# Patient Record
Sex: FEMALE | Race: WHITE | NOT HISPANIC OR LATINO | Employment: FULL TIME | URBAN - METROPOLITAN AREA
[De-identification: names, ages, dates, MRNs, and addresses within clinical notes are randomized per-mention and may not be internally consistent; named-entity substitution may affect disease eponyms.]

---

## 2021-06-04 ENCOUNTER — TRANSCRIBE ORDERS (OUTPATIENT)
Dept: ADMINISTRATIVE | Facility: HOSPITAL | Age: 46
End: 2021-06-04

## 2021-06-04 DIAGNOSIS — G62.9 PERIPHERAL NERVE DISORDER: Primary | ICD-10-CM

## 2021-09-19 ENCOUNTER — APPOINTMENT (EMERGENCY)
Dept: RADIOLOGY | Facility: HOSPITAL | Age: 46
End: 2021-09-19
Payer: COMMERCIAL

## 2021-09-19 ENCOUNTER — HOSPITAL ENCOUNTER (EMERGENCY)
Facility: HOSPITAL | Age: 46
Discharge: HOME | End: 2021-09-19
Attending: EMERGENCY MEDICINE | Admitting: EMERGENCY MEDICINE
Payer: COMMERCIAL

## 2021-09-19 VITALS
SYSTOLIC BLOOD PRESSURE: 180 MMHG | DIASTOLIC BLOOD PRESSURE: 88 MMHG | HEART RATE: 78 BPM | RESPIRATION RATE: 16 BRPM | OXYGEN SATURATION: 98 % | TEMPERATURE: 98 F | WEIGHT: 280 LBS

## 2021-09-19 DIAGNOSIS — M25.562 ACUTE PAIN OF LEFT KNEE: ICD-10-CM

## 2021-09-19 DIAGNOSIS — S83.412A SPRAIN OF MEDIAL COLLATERAL LIGAMENT OF LEFT KNEE, INITIAL ENCOUNTER: Primary | ICD-10-CM

## 2021-09-19 PROCEDURE — 99283 EMERGENCY DEPT VISIT LOW MDM: CPT

## 2021-09-19 PROCEDURE — 99284 EMERGENCY DEPT VISIT MOD MDM: CPT | Performed by: EMERGENCY MEDICINE

## 2021-09-19 PROCEDURE — 73564 X-RAY EXAM KNEE 4 OR MORE: CPT

## 2021-09-19 RX ORDER — ACETAMINOPHEN 325 MG/1
975 TABLET ORAL ONCE
Status: COMPLETED | OUTPATIENT
Start: 2021-09-19 | End: 2021-09-19

## 2021-09-19 RX ADMIN — ACETAMINOPHEN 975 MG: 325 TABLET, FILM COATED ORAL at 19:06

## 2021-09-19 NOTE — DISCHARGE INSTRUCTIONS
Knee Sprain   WHAT YOU NEED TO KNOW:   A knee sprain is a stretched or torn ligament in your knee  Ligaments support the knee and keep the joint and bones in the correct position  A knee sprain may involve one or more ligaments  DISCHARGE INSTRUCTIONS:   Return to the emergency department if:   · Any part of your leg feels cold, numb, or looks pale  Call your doctor if:   · You have new or increased swelling, bruising, or pain in your knee  · Your symptoms do not improve within 6 weeks, even with treatment  · You have questions or concerns about your condition or care  Medicines:   · NSAIDs , such as ibuprofen, help decrease swelling, pain, and fever  This medicine is available with or without a doctor's order  NSAIDs can cause stomach bleeding or kidney problems in certain people  If you take blood thinner medicine, always ask your healthcare provider if NSAIDs are safe for you  Always read the medicine label and follow directions  · Acetaminophen  decreases pain and fever  It is available without a doctor's order  Ask how much to take and how often to take it  Follow directions  Read the labels of all other medicines you are using to see if they also contain acetaminophen, or ask your doctor or pharmacist  Acetaminophen can cause liver damage if not taken correctly  Do not use more than 4 grams (4,000 milligrams) total of acetaminophen in one day  · Prescription pain medicine  may be given  Ask your healthcare provider how to take this medicine safely  Some prescription pain medicines contain acetaminophen  Do not take other medicines that contain acetaminophen without talking to your healthcare provider  Too much acetaminophen may cause liver damage  Prescription pain medicine may cause constipation  Ask your healthcare provider how to prevent or treat constipation  · Take your medicine as directed    Contact your healthcare provider if you think your medicine is not helping or if you have side effects  Tell him or her if you are allergic to any medicine  Keep a list of the medicines, vitamins, and herbs you take  Include the amounts, and when and why you take them  Bring the list or the pill bottles to follow-up visits  Carry your medicine list with you in case of an emergency  A support device  such as a splint or brace may be needed  These devices limit movement and protect the joint while it heals  You may be given crutches to use until you can stand on your injured leg without pain  Physical therapy  may be needed  A physical therapist teaches you exercises to help improve movement and strength, and to decrease pain  Manage a knee sprain:   · Rest  your knee and do not exercise  Do not walk on your injured leg if you are told to keep weight off your knee  Rest helps decrease swelling and allows the injury to heal  You can do gentle range of motion exercises as directed to prevent stiffness  · Apply ice  on your knee for 15 to 20 minutes every hour or as directed  Use an ice pack, or put crushed ice in a plastic bag  Cover the bag with a towel before you apply it  Ice helps prevent tissue damage and decreases swelling and pain  · Apply compression  to your knee as directed  You may need to wear an elastic bandage  This helps keep your injured knee from moving too much while it heals  It should be tight enough to give support but so tight that it causes your toes to feel numb or tingly  Take the bandage off and rewrap it at least 1 time each day  · Elevate your knee  above the level of your heart as often as you can  This will help decrease swelling and pain  Prop your leg on pillows or blankets to keep it elevated comfortably  Do not put pillows directly behind your knee  Prevent another knee sprain:  Exercise your legs to keep your muscles strong  Strong leg muscles help protect your knee and prevent strain   The following may also prevent a knee sprain:  · Slowly start your exercise or training program   Slowly increase the time, distance, and intensity of your exercise  Sudden increases in training may cause another knee sprain  · Wear protective braces and equipment as directed  Braces may prevent your knee from moving the wrong way and causing another sprain  Protective equipment may support your bones and ligaments to prevent injury  · Warm up and stretch before exercise  Warm up by walking or using an exercise bike before starting your regular exercise  Do gentle stretches after warming up  This helps to loosen your muscles and decrease stress on your knee  Cool down and stretch after you exercise  · Wear shoes that fit correctly and support your feet  Replace your running or exercise shoes before the padding or shock absorption is worn out  Ask your healthcare provider which exercise shoes are best for you  Ask if you should wear shoe inserts  Shoe inserts can help support your heels and arches or keep your foot lined up correctly in your shoes  Exercise on flat surfaces  Follow up with your doctor as directed:  Write down your questions so you remember to ask them during your visits  © Copyright [employer] 2021 Information is for End User's use only and may not be sold, redistributed or otherwise used for commercial purposes  All illustrations and images included in CareNotes® are the copyrighted property of [employer]  or [facility] Jonathan Chao   The above information is an  only  It is not intended as medical advice for individual conditions or treatments  Talk to your doctor, nurse or pharmacist before following any medical regimen to see if it is safe and effective for you

## 2021-09-19 NOTE — ED PROVIDER NOTES
History  Chief Complaint   Patient presents with    Knee Pain     c/o left knee pain, unknown cause     40year old female presents for evaluation of left knee pain which has been worsening since feeling a popping sensation while going up steps at [employer] this morning around 9:30 am  Patient reports pain of the medial knee as well as the popliteal fossa  Pain shoots into the left calf with pressure with a constant stiff sensation of the joint  Pain improves with the knee held in extension  No history of prior injury to the left lower extremity  Patient took aleve without significant improvement in pain  History provided by:  Patient  Knee Pain  Location:  Knee  Time since incident:  9 hours  Pain details:     Quality:  Shooting    Severity:  Severe    Onset quality:  Sudden    Duration:  9 hours    Timing:  Constant    Progression:  Waxing and waning  Chronicity:  New  Prior injury to area:  No  Relieved by: extension/rest   Worsened by:  Flexion and bearing weight  Associated symptoms: no fever    Risk factors: obesity    Risk factors: no recent illness        None       History reviewed  No pertinent past medical history  History reviewed  No pertinent surgical history  History reviewed  No pertinent family history  I have reviewed and agree with the history as documented  E-Cigarette/Vaping    E-Cigarette Use Never User      E-Cigarette/Vaping Substances    Nicotine No     THC No     CBD No     Flavoring No     Other No     Unknown No      Social History     Tobacco Use    Smoking status: Never Smoker   Vaping Use    Vaping Use: Never used   Substance Use Topics    Alcohol use: Not on file    Drug use: Not on file       Review of Systems   Constitutional: Negative for appetite change, chills and fever  HENT: Negative for congestion and sore throat  Respiratory: Negative for cough, chest tightness and shortness of breath  Cardiovascular: Negative for chest pain and leg swelling  Gastrointestinal: Negative for abdominal pain, constipation, diarrhea, nausea and vomiting  Musculoskeletal: Positive for arthralgias  Negative for myalgias  Skin: Negative for rash and wound  Neurological: Negative for dizziness, syncope and headaches  All other systems reviewed and are negative  Physical Exam  Physical Exam  Vitals and nursing note reviewed  Constitutional:       General: She is not in acute distress  Appearance: She is well-developed  She is not toxic-appearing or diaphoretic  HENT:      Head: Normocephalic and atraumatic  Right Ear: External ear normal       Left Ear: External ear normal       Nose: Nose normal    Eyes:      General: No scleral icterus  Pulmonary:      Effort: Pulmonary effort is normal  No respiratory distress  Abdominal:      General: There is no distension  Musculoskeletal:         General: No deformity  Normal range of motion  Comments: Tenderness medial left knee and popliteal fossa  Full ROM  Negative Will  No laxity on anterior/posterior drawer testing  Skin:     General: Skin is warm and dry  Findings: No rash  Neurological:      General: No focal deficit present  Mental Status: She is alert and oriented to person, place, and time     Psychiatric:         Mood and Affect: Mood normal          Vital Signs  ED Triage Vitals   Temperature Pulse Respirations Blood Pressure SpO2   09/19/21 1853 09/19/21 1853 09/19/21 1853 09/19/21 1853 09/19/21 1853   98 °F (36 7 °C) 101 18 (!) 184/91 94 %      Temp Source Heart Rate Source Patient Position - Orthostatic VS BP Location FiO2 (%)   09/19/21 1853 09/19/21 1853 09/19/21 1853 09/19/21 1853 --   Temporal Monitor Lying Right arm       Pain Score       09/19/21 1900       6           Vitals:    09/19/21 1853 09/19/21 1900   BP: (!) 184/91 (!) 184/91   Pulse: 101 88   Patient Position - Orthostatic VS: Lying Lying         Visual Acuity      ED Medications  Medications acetaminophen (TYLENOL) tablet 975 mg (975 mg Oral Given 9/19/21 1906)       Diagnostic Studies  Results Reviewed     None                 XR knee 4+ views left injury   ED Interpretation by Rishabh Davila MD (09/19 1920)   No acute fractures or dislocations                 Procedures  Procedures         ED Course  ED Course as of Sep 19 2011   Sun Sep 19, [id number] Systolic blood pressure left lower extremity 176 mmHg  Systolic blood pressure right upper extremity 184 mmHg  KAMILLA 0 956                                SBIRT 22yo+      Most Recent Value   SBIRT (23 yo +)   In order to provide better care to our patients, we are screening all of our patients for alcohol and drug use  Would it be okay to ask you these screening questions? No Filed at: 09/19/2021 1901                    MDM  Number of Diagnoses or Management Options  Acute pain of left knee: new and requires workup  Sprain of medial collateral ligament of left knee, initial encounter: new and requires workup  Diagnosis management comments: 40year old female presents for evaluation of left knee pain  Medial knee and popliteal fossa tenderness on exam  KAMILLA WNL  Xray unreamarkable on my interpretation  Possible MCL sprain  Ace wrap applied with normal distal sensation and perfusion prior to and post placement  RICE therapy  Ortho follow up  Return precautions provided         Amount and/or Complexity of Data Reviewed  Tests in the radiology section of CPT®: ordered  Independent visualization of images, tracings, or specimens: yes    Patient Progress  Patient progress: stable      Disposition  Final diagnoses:   Acute pain of left knee   Sprain of medial collateral ligament of left knee, initial encounter     Time reflects when diagnosis was documented in both MDM as applicable and the Disposition within this note     Time User Action Codes Description Comment    9/19/2021  7:21 PM Antolin Monson Add [M25 562] Left knee pain     9/19/2021  7:21 PM Damien Baliro [W27 533] Left knee pain     9/19/2021  7:21 PM Antolin Ermias Add [M25 562] Acute pain of left knee     9/19/2021  7:21 PM Antolin Ermias Add [J66 083U] Strain of left knee, initial encounter     9/19/2021  7:21 PM Antolin Ermias Modify [M25 562] Acute pain of left knee     9/19/2021  7:21 PM Antolin Ermias Modify [D11 780N] Strain of left knee, initial encounter     9/19/2021  7:22 PM Trisha Yancey Add [N78 556V] Sprain of medial collateral ligament of left knee, initial encounter     9/19/2021  7:22 PM Antolin Ermias Modify [J86 246Y] Strain of left knee, initial encounter     9/19/2021  7:22 PM Antolin Ermias Modify [R24 275F] Sprain of medial collateral ligament of left knee, initial encounter     9/19/2021  7:22 PM Trisha Yancey Remove [Q04 333O] Strain of left knee, initial encounter       ED Disposition     ED Disposition Condition Date/Time Comment    Discharge Stable Sun Sep 19, 2021  7:22 PM Heath Eddy discharge to home/self care  Follow-up Information     Follow up With Specialties Details Why Contact Info Additional [address] [facility] Orthopedic Surgery Schedule an appointment as soon as possible for a visit in 1 week for re-evaluation if symptoms have not resolved [phone] [facility] [id number]  [address] [place] [address] Special[facility], [address] [place] [address], [facility] [address]     [address] Emergency Department Emergency Medicine Go to  If symptoms worsen, numbness or discoloration of the foot [address] [place],[address] [id number]  [address] [facility] Emergency Department, [address] [place] [address] 10          Patient's Medications    No medications on file     No discharge procedures on file      PDMP Review None          ED Provider  Electronically Signed by           Joshua Coats MD  09/19/21 1925       Joshua Coats MD  09/19/21 2011

## 2021-09-19 NOTE — Clinical Note
Azeb Copeland was seen and treated in our emergency department on 9/19/2021  Diagnosis:     Dayday Reeder    She may return on this date: If you have any questions or concerns, please don't hesitate to call        Breezy Santana MD    ______________________________           _______________          _______________  Hospital Representative                              Date                                Time

## 2021-09-20 NOTE — ED NOTES
No knee immobilizers available that would appropriately fit pt  Provided notified  Provider applied ace bandage to L knee  Pt vascular system WNL          Clearance Query, RN  09/19/21 2011

## 2022-09-02 ENCOUNTER — HOSPITAL ENCOUNTER (EMERGENCY)
Facility: HOSPITAL | Age: 47
Discharge: HOME | End: 2022-09-02
Attending: EMERGENCY MEDICINE
Payer: COMMERCIAL

## 2022-09-02 ENCOUNTER — OFFICE VISIT (OUTPATIENT)
Dept: URGENT CARE | Facility: CLINIC | Age: 47
End: 2022-09-02
Payer: COMMERCIAL

## 2022-09-02 ENCOUNTER — APPOINTMENT (EMERGENCY)
Dept: CT IMAGING | Facility: HOSPITAL | Age: 47
End: 2022-09-02
Payer: COMMERCIAL

## 2022-09-02 VITALS
WEIGHT: 293 LBS | HEIGHT: 61 IN | DIASTOLIC BLOOD PRESSURE: 85 MMHG | HEART RATE: 86 BPM | SYSTOLIC BLOOD PRESSURE: 152 MMHG | TEMPERATURE: 98.9 F | OXYGEN SATURATION: 98 % | RESPIRATION RATE: 18 BRPM | BODY MASS INDEX: 55.32 KG/M2

## 2022-09-02 VITALS
OXYGEN SATURATION: 99 % | WEIGHT: 293 LBS | RESPIRATION RATE: 16 BRPM | TEMPERATURE: 98.5 F | BODY MASS INDEX: 55.32 KG/M2 | DIASTOLIC BLOOD PRESSURE: 86 MMHG | HEIGHT: 61 IN | SYSTOLIC BLOOD PRESSURE: 142 MMHG | HEART RATE: 85 BPM

## 2022-09-02 DIAGNOSIS — E87.6 HYPOKALEMIA: ICD-10-CM

## 2022-09-02 DIAGNOSIS — R10.31 RIGHT LOWER QUADRANT ABDOMINAL PAIN: Primary | ICD-10-CM

## 2022-09-02 DIAGNOSIS — R10.31 RIGHT LOWER QUADRANT ABDOMINAL PAIN: ICD-10-CM

## 2022-09-02 DIAGNOSIS — R10.9 RIGHT-SIDED ABDOMINAL PAIN OF UNKNOWN ETIOLOGY: Primary | ICD-10-CM

## 2022-09-02 LAB
ALBUMIN SERPL BCP-MCNC: 2.5 G/DL (ref 3.5–5)
ALP SERPL-CCNC: 60 U/L (ref 46–116)
ALT SERPL W P-5'-P-CCNC: 28 U/L (ref 12–78)
ANION GAP SERPL CALCULATED.3IONS-SCNC: 8 MMOL/L (ref 4–13)
AST SERPL W P-5'-P-CCNC: 11 U/L (ref 5–45)
BASOPHILS # BLD AUTO: 0.08 THOUSANDS/ΜL (ref 0–0.1)
BASOPHILS NFR BLD AUTO: 1 % (ref 0–1)
BILIRUB SERPL-MCNC: 0.3 MG/DL (ref 0.2–1)
BILIRUB UR QL STRIP: NEGATIVE
BUN SERPL-MCNC: 6 MG/DL (ref 5–25)
CALCIUM ALBUM COR SERPL-MCNC: 7.9 MG/DL (ref 8.3–10.1)
CALCIUM SERPL-MCNC: 6.7 MG/DL (ref 8.3–10.1)
CHLORIDE SERPL-SCNC: 113 MMOL/L (ref 96–108)
CLARITY UR: CLEAR
CO2 SERPL-SCNC: 22 MMOL/L (ref 21–32)
COLOR UR: YELLOW
CREAT SERPL-MCNC: 0.41 MG/DL (ref 0.6–1.3)
EOSINOPHIL # BLD AUTO: 0.23 THOUSAND/ΜL (ref 0–0.61)
EOSINOPHIL NFR BLD AUTO: 2 % (ref 0–6)
ERYTHROCYTE [DISTWIDTH] IN BLOOD BY AUTOMATED COUNT: 14.2 % (ref 11.6–15.1)
EXT PREG TEST URINE: NEGATIVE
EXT. CONTROL ED NAV: NORMAL
GFR SERPL CREATININE-BSD FRML MDRD: 124 ML/MIN/1.73SQ M
GLUCOSE SERPL-MCNC: 77 MG/DL (ref 65–140)
GLUCOSE UR STRIP-MCNC: NEGATIVE MG/DL
HCT VFR BLD AUTO: 40.8 % (ref 34.8–46.1)
HGB BLD-MCNC: 12.6 G/DL (ref 11.5–15.4)
HGB UR QL STRIP.AUTO: NEGATIVE
IMM GRANULOCYTES # BLD AUTO: 0.04 THOUSAND/UL (ref 0–0.2)
IMM GRANULOCYTES NFR BLD AUTO: 0 % (ref 0–2)
KETONES UR STRIP-MCNC: NEGATIVE MG/DL
LEUKOCYTE ESTERASE UR QL STRIP: NEGATIVE
LYMPHOCYTES # BLD AUTO: 2.43 THOUSANDS/ΜL (ref 0.6–4.47)
LYMPHOCYTES NFR BLD AUTO: 21 % (ref 14–44)
MCH RBC QN AUTO: 26.7 PG (ref 26.8–34.3)
MCHC RBC AUTO-ENTMCNC: 30.9 G/DL (ref 31.4–37.4)
MCV RBC AUTO: 86 FL (ref 82–98)
MONOCYTES # BLD AUTO: 0.64 THOUSAND/ΜL (ref 0.17–1.22)
MONOCYTES NFR BLD AUTO: 6 % (ref 4–12)
NEUTROPHILS # BLD AUTO: 8.11 THOUSANDS/ΜL (ref 1.85–7.62)
NEUTS SEG NFR BLD AUTO: 70 % (ref 43–75)
NITRITE UR QL STRIP: NEGATIVE
NRBC BLD AUTO-RTO: 0 /100 WBCS
PH UR STRIP.AUTO: 6 [PH]
PLATELET # BLD AUTO: 325 THOUSANDS/UL (ref 149–390)
PMV BLD AUTO: 10.6 FL (ref 8.9–12.7)
POTASSIUM SERPL-SCNC: 2.7 MMOL/L (ref 3.5–5.3)
PROT SERPL-MCNC: 5.3 G/DL (ref 6.4–8.4)
PROT UR STRIP-MCNC: NEGATIVE MG/DL
RBC # BLD AUTO: 4.72 MILLION/UL (ref 3.81–5.12)
SODIUM SERPL-SCNC: 143 MMOL/L (ref 135–147)
SP GR UR STRIP.AUTO: 1.02 (ref 1–1.03)
UROBILINOGEN UR QL STRIP.AUTO: 0.2 E.U./DL
WBC # BLD AUTO: 11.53 THOUSAND/UL (ref 4.31–10.16)

## 2022-09-02 PROCEDURE — 99284 EMERGENCY DEPT VISIT MOD MDM: CPT | Performed by: EMERGENCY MEDICINE

## 2022-09-02 PROCEDURE — 96361 HYDRATE IV INFUSION ADD-ON: CPT

## 2022-09-02 PROCEDURE — 85025 COMPLETE CBC W/AUTO DIFF WBC: CPT | Performed by: EMERGENCY MEDICINE

## 2022-09-02 PROCEDURE — 81025 URINE PREGNANCY TEST: CPT | Performed by: EMERGENCY MEDICINE

## 2022-09-02 PROCEDURE — 96375 TX/PRO/DX INJ NEW DRUG ADDON: CPT

## 2022-09-02 PROCEDURE — 80053 COMPREHEN METABOLIC PANEL: CPT | Performed by: EMERGENCY MEDICINE

## 2022-09-02 PROCEDURE — G0382 LEV 3 HOSP TYPE B ED VISIT: HCPCS | Performed by: FAMILY MEDICINE

## 2022-09-02 PROCEDURE — 36415 COLL VENOUS BLD VENIPUNCTURE: CPT | Performed by: EMERGENCY MEDICINE

## 2022-09-02 PROCEDURE — 99284 EMERGENCY DEPT VISIT MOD MDM: CPT

## 2022-09-02 PROCEDURE — 96366 THER/PROPH/DIAG IV INF ADDON: CPT

## 2022-09-02 PROCEDURE — 96365 THER/PROPH/DIAG IV INF INIT: CPT

## 2022-09-02 PROCEDURE — 81003 URINALYSIS AUTO W/O SCOPE: CPT | Performed by: EMERGENCY MEDICINE

## 2022-09-02 PROCEDURE — 74177 CT ABD & PELVIS W/CONTRAST: CPT

## 2022-09-02 RX ORDER — NAPROXEN 500 MG/1
500 TABLET ORAL 2 TIMES DAILY PRN
Qty: 20 TABLET | Refills: 0 | Status: SHIPPED | OUTPATIENT
Start: 2022-09-02 | End: 2022-09-12

## 2022-09-02 RX ORDER — POTASSIUM CHLORIDE 14.9 MG/ML
20 INJECTION INTRAVENOUS ONCE
Status: COMPLETED | OUTPATIENT
Start: 2022-09-02 | End: 2022-09-02

## 2022-09-02 RX ORDER — SERTRALINE HYDROCHLORIDE 25 MG/1
TABLET, FILM COATED ORAL
COMMUNITY
Start: 2022-07-26

## 2022-09-02 RX ORDER — KETOROLAC TROMETHAMINE 30 MG/ML
30 INJECTION, SOLUTION INTRAMUSCULAR; INTRAVENOUS ONCE
Status: COMPLETED | OUTPATIENT
Start: 2022-09-02 | End: 2022-09-02

## 2022-09-02 RX ORDER — POTASSIUM CHLORIDE 750 MG/1
10 TABLET, EXTENDED RELEASE ORAL 2 TIMES DAILY
Qty: 10 TABLET | Refills: 0 | Status: SHIPPED | OUTPATIENT
Start: 2022-09-02 | End: 2022-09-07

## 2022-09-02 RX ORDER — POTASSIUM CHLORIDE 20 MEQ/1
40 TABLET, EXTENDED RELEASE ORAL ONCE
Status: COMPLETED | OUTPATIENT
Start: 2022-09-02 | End: 2022-09-02

## 2022-09-02 RX ORDER — CHOLECALCIFEROL (VITAMIN D3) 1250 MCG
CAPSULE ORAL
COMMUNITY
Start: 2022-07-08

## 2022-09-02 RX ADMIN — KETOROLAC TROMETHAMINE 30 MG: 30 INJECTION, SOLUTION INTRAMUSCULAR; INTRAVENOUS at 12:25

## 2022-09-02 RX ADMIN — POTASSIUM CHLORIDE 40 MEQ: 20 TABLET, EXTENDED RELEASE ORAL at 13:38

## 2022-09-02 RX ADMIN — POTASSIUM CHLORIDE 20 MEQ: 14.9 INJECTION, SOLUTION INTRAVENOUS at 13:38

## 2022-09-02 RX ADMIN — SODIUM CHLORIDE 1000 ML: 0.9 INJECTION, SOLUTION INTRAVENOUS at 12:25

## 2022-09-02 RX ADMIN — IOHEXOL 65 ML: 350 INJECTION, SOLUTION INTRAVENOUS at 13:28

## 2022-09-02 NOTE — ED PROVIDER NOTES
History  Chief Complaint   Patient presents with    Abdominal Pain     Lower r abd pain that started Wednesday that has been getting worse      57 yo female with hx of  x 2 in distant past, presents with onset 2 days ago of R sided abd pain  States it initially started more in R flank and she thought she pulled something, then moved to RLQ and has remained  No appetite today  No NVD or fever, chills, no urinary symptoms  Pt went to urgent care and was sent for CT a/p  History provided by:  Patient   used: No    Abdominal Pain  Pain location:  RLQ  Pain quality: aching    Pain radiates to:  Does not radiate  Pain severity:  Moderate  Onset quality:  Gradual  Duration:  2 days  Timing:  Constant  Progression:  Worsening  Chronicity:  New  Context: previous surgery (2 csections)    Context: not sick contacts and not suspicious food intake    Relieved by:  Nothing  Worsened by: Movement and palpation  Ineffective treatments:  None tried  Associated symptoms: anorexia    Associated symptoms: no chest pain, no chills, no cough, no diarrhea, no dysuria, no fever, no hematuria, no nausea, no shortness of breath, no sore throat and no vomiting        Prior to Admission Medications   Prescriptions Last Dose Informant Patient Reported? Taking? Cholecalciferol (Vitamin D3) 1 25 MG (63953 UT) CAPS   Yes No   sertraline (ZOLOFT) 25 mg tablet   Yes No      Facility-Administered Medications: None       History reviewed  No pertinent past medical history  History reviewed  No pertinent surgical history  History reviewed  No pertinent family history  I have reviewed and agree with the history as documented      E-Cigarette/Vaping    E-Cigarette Use Never User      E-Cigarette/Vaping Substances    Nicotine No     THC No     CBD No     Flavoring No     Other No     Unknown No      Social History     Tobacco Use    Smoking status: Never Smoker    Smokeless tobacco: Never Used Vaping Use    Vaping Use: Never used       Review of Systems   Constitutional: Positive for appetite change  Negative for chills and fever  HENT: Negative for ear pain and sore throat  Eyes: Negative for pain and visual disturbance  Respiratory: Negative for cough and shortness of breath  Cardiovascular: Negative for chest pain and palpitations  Gastrointestinal: Positive for abdominal pain (RLQ) and anorexia  Negative for diarrhea, nausea and vomiting  Genitourinary: Positive for flank pain (mild initially 2 days ago)  Negative for dysuria and hematuria  Musculoskeletal: Negative for arthralgias and back pain  Skin: Negative for color change and rash  Neurological: Negative for seizures and syncope  All other systems reviewed and are negative  Physical Exam  Physical Exam  Vitals and nursing note reviewed  Constitutional:       General: She is not in acute distress  Appearance: She is well-developed  HENT:      Head: Normocephalic and atraumatic  Eyes:      Conjunctiva/sclera: Conjunctivae normal    Cardiovascular:      Rate and Rhythm: Normal rate and regular rhythm  Heart sounds: No murmur heard  Pulmonary:      Effort: Pulmonary effort is normal  No respiratory distress  Breath sounds: Normal breath sounds  Abdominal:      Palpations: Abdomen is soft  Tenderness: There is abdominal tenderness in the right lower quadrant  There is no right CVA tenderness, left CVA tenderness, guarding or rebound  Positive signs include McBurney's sign  Musculoskeletal:      Cervical back: Neck supple  Skin:     General: Skin is warm and dry  Neurological:      Mental Status: She is alert and oriented to person, place, and time     Psychiatric:         Behavior: Behavior normal          Vital Signs  ED Triage Vitals [09/02/22 1216]   Temperature Pulse Respirations Blood Pressure SpO2   98 9 °F (37 2 °C) 78 18 (!) 180/90 96 %      Temp Source Heart Rate Source Patient Position - Orthostatic VS BP Location FiO2 (%)   Temporal Monitor Lying Right arm --      Pain Score       7           Vitals:    09/02/22 1216 09/02/22 1344 09/02/22 1430 09/02/22 1500   BP: (!) 180/90 155/92 151/99 152/85   Pulse: 78 78 83 86   Patient Position - Orthostatic VS: Lying  Lying Lying         Visual Acuity      ED Medications  Medications   sodium chloride 0 9 % bolus 1,000 mL (0 mL Intravenous Stopped 9/2/22 1529)   ketorolac (TORADOL) injection 30 mg (30 mg Intravenous Given 9/2/22 1225)   potassium chloride (K-DUR,KLOR-CON) CR tablet 40 mEq (40 mEq Oral Given 9/2/22 1338)   potassium chloride 20 mEq IVPB (premix) (0 mEq Intravenous Stopped 9/2/22 1529)   iohexol (OMNIPAQUE) 350 MG/ML injection (MULTI-DOSE) 65 mL (65 mL Intravenous Given 9/2/22 1328)       Diagnostic Studies  Results Reviewed     Procedure Component Value Units Date/Time    Comprehensive metabolic panel [[id number]]  (Abnormal) Collected: 09/02/22 1223    Lab Status: Final result Specimen: Blood from Arm, Left Updated: 09/02/22 1322     Sodium 143 mmol/L      Potassium 2 7 mmol/L      Chloride 113 mmol/L      CO2 22 mmol/L      ANION GAP 8 mmol/L      BUN 6 mg/dL      Creatinine 0 41 mg/dL      Glucose 77 mg/dL      Calcium 6 7 mg/dL      Corrected Calcium 7 9 mg/dL      AST 11 U/L      ALT 28 U/L      Alkaline Phosphatase 60 U/L      Total Protein 5 3 g/dL      Albumin 2 5 g/dL      Total Bilirubin 0 30 mg/dL      eGFR 124 ml/min/1 73sq m     Narrative:      Mario guidelines for Chronic Kidney Disease (CKD):     Stage 1 with normal or high GFR (GFR > 90 mL/min/1 73 square meters)    Stage 2 Mild CKD (GFR = 60-89 mL/min/1 73 square meters)    Stage 3A Moderate CKD (GFR = 45-59 mL/min/1 73 square meters)    Stage 3B Moderate CKD (GFR = 30-44 mL/min/1 73 square meters)    Stage 4 Severe CKD (GFR = 15-29 mL/min/1 73 square meters)    Stage 5 End Stage CKD (GFR <15 mL/min/1 73 square meters)  Note: GFR calculation is accurate only with a steady state creatinine    UA w Reflex to Microscopic w Reflex to Culture [[id number]] Collected: 09/02/22 1224    Lab Status: Final result Specimen: Urine, Clean Catch Updated: 09/02/22 1252     Color, UA Yellow     Clarity, UA Clear     Specific Gravity, UA 1 025     pH, UA 6 0     Leukocytes, UA Negative     Nitrite, UA Negative     Protein, UA Negative mg/dl      Glucose, UA Negative mg/dl      Ketones, UA Negative mg/dl      Urobilinogen, UA 0 2 E U /dl      Bilirubin, UA Negative     Occult Blood, UA Negative    CBC and differential [[id number]]  (Abnormal) Collected: 09/02/22 1223    Lab Status: Final result Specimen: Blood from Arm, Left Updated: 09/02/22 1236     WBC 11 53 Thousand/uL      RBC 4 72 Million/uL      Hemoglobin 12 6 g/dL      Hematocrit 40 8 %      MCV 86 fL      MCH 26 7 pg      MCHC 30 9 g/dL      RDW 14 2 %      MPV 10 6 fL      Platelets 137 Thousands/uL      nRBC 0 /100 WBCs      Neutrophils Relative 70 %      Immat GRANS % 0 %      Lymphocytes Relative 21 %      Monocytes Relative 6 %      Eosinophils Relative 2 %      Basophils Relative 1 %      Neutrophils Absolute 8 11 Thousands/µL      Immature Grans Absolute 0 04 Thousand/uL      Lymphocytes Absolute 2 43 Thousands/µL      Monocytes Absolute 0 64 Thousand/µL      Eosinophils Absolute 0 23 Thousand/µL      Basophils Absolute 0 08 Thousands/µL     POCT pregnancy, urine [[id number]]  (Normal) Resulted: 09/02/22 1230    Lab Status: Final result Updated: 09/02/22 1230     EXT PREG TEST UR (Ref: Negative) negative     Control valid                 CT abdomen pelvis with contrast   Final Result by Suraj Syed MD (09/02 1402)   Addendum 1 of 1 by Suraj Syed MD (09/02 1402)   Please note: Splenic hypodensity measuring 16 mm is nonspecific but in a    41-year-old female is statistically most likely represent benign    hemangioma or lymphangioma    Conservative management with follow-up ultrasound in 3-6 months to reevaluate for size    stability is recommended  This examination was marked "immediate notification" in Epic in order to    begin the standard process by which the radiology reading room liaison    alerts the referring practitioner  Final      No acute pathology  Normal appendix  Hepatomegaly and hepatic steatosis  Nonobstructing 2 mm lower pole right renal calculus  Sigmoid    diverticulosis  Workstation performed: [id number]                    Procedures  Procedures         ED Course  ED Course as of 22 1610   Fri Sep 02, 2022   1206 Pt seen and examined  55 yo female with hx of  x 2 in distant past, presents with onset 2 days ago of R sided abd pain  States it initially started more in R flank and she thought she pulled something, then moved to RLQ and has remained  No appetite today  No NVD or fever, chills, no urinary symptoms  Pt went to urgent care and was sent for CT a/p  Will give IVF, toradol and check labs, urine and CT a/p r/o appy vs other  1237 WBC 11 53   1252 Urine NEG for blood or infection  Reviewed lab and urine findings with pt who is resting more comfortably after toradol  Aware we are awaiting CMP and then she will go to CT a/p    1322 K 2 7 - will replete oral and IV  Creat nml - ok to go to CT scanner  5 CT shows No acute pathology  Normal appendix  Hepatomegaly and hepatic steatosis  Nonobstructing 2 mm lower pole right renal calculus  Sigmoid diverticulosis  Pt made aware of all results, will replete K and d/c home on NSAIDS, tylenol and K+ replacement  SBIRT 22yo+    Flowsheet Row Most Recent Value   SBIRT (25 yo +)    In order to provide better care to our patients, we are screening all of our patients for alcohol and drug use  Would it be okay to ask you these screening questions?  Yes Filed at: 2022 1231   Initial Alcohol Screen: US AUDIT-C 1  How often do you have a drink containing alcohol? 0 Filed at: 09/02/2022 1231   2  How many drinks containing alcohol do you have on a typical day you are drinking? 0 Filed at: 09/02/2022 1231   3a  Male UNDER 65: How often do you have five or more drinks on one occasion? 0 Filed at: 09/02/2022 1231   3b  FEMALE Any Age, or MALE 65+: How often do you have 4 or more drinks on one occassion? 0 Filed at: 09/02/2022 1231   Audit-C Score 0 Filed at: 09/02/2022 1231   DIEGO: How many times in the past year have you    Used an illegal drug or used a prescription medication for non-medical reasons? Never Filed at: 09/02/2022 1231                    MDM    Disposition  Final diagnoses:   Right-sided abdominal pain of unknown etiology   Hypokalemia   Right lower quadrant abdominal pain     Time reflects when diagnosis was documented in both MDM as applicable and the Disposition within this note     Time User Action Codes Description Comment    9/2/2022  2:29 PM Shravan WARE Add [R10 9] Right-sided abdominal pain of unknown etiology     9/2/2022  2:29 PM Shravan WARE Add [E87 6] Hypokalemia     9/2/2022  2:29 PM Shravan Stagebisi WARE Add [R10 31] Right lower quadrant abdominal pain       ED Disposition     ED Disposition   Discharge    Condition   Stable    Date/Time   Fri Sep 2, 2022  2:29 PM    Comment   Aureliano India discharge to home/self care                 Follow-up Information     Follow up With Specialties Details Why Camilla, DO Internal Medicine In 1 week  [address]  [phone]            Discharge Medication List as of 9/2/2022  2:30 PM      START taking these medications    Details   naproxen (NAPROSYN) 500 mg tablet Take 1 tablet (500 mg total) by mouth 2 (two) times a day as needed for mild pain or moderate pain for up to 10 days, Starting Fri 9/2/2022, Until Mon 9/12/2022 at 2359, Normal      potassium chloride (K-DUR,KLOR-CON) 10 mEq tablet Take 1 tablet (10 mEq total) by mouth 2 (two) times a day for 5 days, Starting Fri 9/2/2022, Until Wed 9/7/2022, Normal         CONTINUE these medications which have NOT CHANGED    Details   Cholecalciferol (Vitamin D3) 1 25 MG (41979 UT) CAPS Starting Fri 7/8/2022, Historical Med      sertraline (ZOLOFT) 25 mg tablet Starting Tue 7/26/2022, Historical Med             No discharge procedures on file      PDMP Review     None          ED Provider  Electronically Signed by           Kandace Villeda DO  09/02/22 9645

## 2022-09-02 NOTE — PROGRESS NOTES
[facility]        NAME: Kerrie Wren is a 56 y o  female  : 1975    MRN: [id number]  DATE: 2022  TIME: 1:33 PM    Assessment and Plan   Right lower quadrant abdominal pain [R10 31]  1  Right lower quadrant abdominal pain           Patient Instructions       Follow up with PCP in 3-5 days  Proceed to  ER if symptoms worsen  Chief Complaint     Chief Complaint   Patient presents with    Abdominal Pain     Pt reports RLQ pain that began on Wednesday  Denies injury, fevers or N/V  Reports diarrhea on Wednesday which is now resolved  History of Present Illness       20-year-old female with 1 week history of worsening abdominal pain in the right lower quadrant  Review of Systems   Review of Systems   Constitutional: Negative  HENT: Negative  Eyes: Negative  Respiratory: Negative  Cardiovascular: Negative  Gastrointestinal: Positive for abdominal pain  Genitourinary: Negative  Skin: Negative  Allergic/Immunologic: Negative  Neurological: Negative  Hematological: Negative  Psychiatric/Behavioral: Negative  Current Medications     No current facility-administered medications for this visit      Current Outpatient Medications:     Cholecalciferol (Vitamin D3) 1 25 MG (17246 UT) CAPS, , Disp: , Rfl:     sertraline (ZOLOFT) 25 mg tablet, , Disp: , Rfl:     Facility-Administered Medications Ordered in Other Visits:     potassium chloride (K-DUR,KLOR-CON) CR tablet 40 mEq, 40 mEq, Oral, Once, Kishan Parham, DO    potassium chloride 20 mEq IVPB (premix), 20 mEq, Intravenous, Once, Kishan Parham, DO    Current Allergies     Allergies as of 2022    (No Known Allergies)            The following portions of the patient's history were reviewed and updated as appropriate: allergies, current medications, past family history, past medical history, past social history, past surgical history and problem list      No past medical history on file  No past surgical history on file  No family history on file  Medications have been verified  Objective   /86   Pulse 85   Temp 98 5 °F (36 9 °C)   Resp 16   Ht 5' 1" (1 549 m)   Wt 133 kg (293 lb)   SpO2 99%   BMI 55 36 kg/m²   No LMP recorded  Patient has had a hysterectomy  Physical Exam     Physical Exam  Vitals and nursing note reviewed  Constitutional:       Appearance: She is well-developed  HENT:      Head: Normocephalic  Eyes:      Pupils: Pupils are equal, round, and reactive to light  Pulmonary:      Effort: Pulmonary effort is normal    Abdominal:      General: Abdomen is flat  Palpations: Abdomen is soft  Tenderness: There is abdominal tenderness in the right lower quadrant  Musculoskeletal:         General: Normal range of motion  Skin:     General: Skin is warm and dry  Neurological:      Mental Status: She is alert and oriented to person, place, and time

## 2023-05-02 ENCOUNTER — HOSPITAL ENCOUNTER (OUTPATIENT)
Dept: ULTRASOUND IMAGING | Facility: HOSPITAL | Age: 48
Discharge: HOME | End: 2023-05-02

## 2023-05-02 DIAGNOSIS — D73.0 HYPOSPLENISM: ICD-10-CM

## 2024-01-29 ENCOUNTER — OFFICE VISIT (OUTPATIENT)
Dept: FAMILY MEDICINE CLINIC | Facility: HOSPITAL | Age: 49
End: 2024-01-29
Payer: COMMERCIAL

## 2024-01-29 VITALS
HEIGHT: 61 IN | HEART RATE: 75 BPM | SYSTOLIC BLOOD PRESSURE: 138 MMHG | WEIGHT: 269 LBS | OXYGEN SATURATION: 98 % | BODY MASS INDEX: 50.79 KG/M2 | DIASTOLIC BLOOD PRESSURE: 82 MMHG

## 2024-01-29 DIAGNOSIS — Z12.11 SCREENING FOR COLON CANCER: ICD-10-CM

## 2024-01-29 DIAGNOSIS — R51.9 FREQUENT HEADACHES: ICD-10-CM

## 2024-01-29 DIAGNOSIS — R68.89 HEAT INTOLERANCE: ICD-10-CM

## 2024-01-29 DIAGNOSIS — Z76.89 ESTABLISHING CARE WITH NEW DOCTOR, ENCOUNTER FOR: Primary | ICD-10-CM

## 2024-01-29 DIAGNOSIS — E66.01 CLASS 3 SEVERE OBESITY DUE TO EXCESS CALORIES WITHOUT SERIOUS COMORBIDITY WITH BODY MASS INDEX (BMI) OF 50.0 TO 59.9 IN ADULT (HCC): ICD-10-CM

## 2024-01-29 PROBLEM — E66.9 OBESITY: Status: ACTIVE | Noted: 2024-01-29

## 2024-01-29 PROBLEM — F41.9 ANXIETY: Status: ACTIVE | Noted: 2024-01-29

## 2024-01-29 PROCEDURE — 99204 OFFICE O/P NEW MOD 45 MIN: CPT | Performed by: STUDENT IN AN ORGANIZED HEALTH CARE EDUCATION/TRAINING PROGRAM

## 2024-01-29 RX ORDER — SERTRALINE HYDROCHLORIDE 25 MG/1
25 TABLET, FILM COATED ORAL DAILY
COMMUNITY
End: 2024-01-29

## 2024-01-29 RX ORDER — SOLIFENACIN SUCCINATE 5 MG/1
5 TABLET, FILM COATED ORAL DAILY
COMMUNITY
Start: 2023-12-09

## 2024-01-29 NOTE — PROGRESS NOTES
[facility]   Tara Knapp DO    Assessment/Plan:      Diagnosis ICD-10-CM Associated Orders   1. Establishing care with new doctor, encounter for  Z76.89       2. Screening for colon cancer  Z12.11 Ambulatory Referral to Gastroenterology      3. Class 3 severe obesity due to excess calories without serious comorbidity with body mass index (BMI) of 50.0 to 59.9 in adult (HCC)  E66.01     Z68.43       4. Heat intolerance  R68.89       5. Frequent headaches  R51.9         Check hormone levels for menopause concerns.   Get blood work results in & assess which labs need to be considered.   Get Mammo records.   See GI for scope screening.   No follow-ups on file.  Patient may call or return to office with any questions or concerns.   ___________________________________________________________________  Subjective:     Patient ID: Miki Morgan is a 48 y.o. female.  HPI  Miki Morgan  Chief Complaint   Patient presents with    Establish Care     Was seeing Dr. Couch.     Seeing GYN - on vesicare - 1 yr of it now. Seeing [facility].   2 C/S, lots of scar tissue.   UTD with Mammos.   Had hysterectomy in 2012, ovaries intact.   Told to lose weight & the RLQ pain would get better.   CT Ab/Pe -2022 -   Final       No acute pathology.  Normal appendix.       Hepatomegaly and hepatic steatosis.       Nonobstructing 2 mm lower pole right renal calculus.  Sigmoid    diverticulosis.     Zoloft - Had been up to 50 mg, too much, felt like a zombie, eventually stopped it altogether   Seeing a therapist, concern about possible OCD. Gets repetitive racing thoughts.     Labs done in Sept.   Did have GDM with first & not with second.   No lipids or DM2 issues.   Both grandma's had diabetes.     Just hx of Vit D being low.   Had cortisol tested.      The following portions of the patient's history were reviewed and updated as appropriate: allergies, current medications, past medical history, and problem list.    Review of  "Systems   Respiratory:  Negative for cough and shortness of breath.         SWIFT + from weight   Cardiovascular:  Negative for chest pain and palpitations.   Gastrointestinal:  Negative for diarrhea, nausea and vomiting.   Endocrine: Positive for heat intolerance (used to).   Genitourinary:  Negative for hematuria and menstrual problem.   Neurological:  Positive for headaches (advil if needed). Negative for dizziness and light-headedness.       Objective:      Vitals:    01/29/24 1530   BP: 138/82   Pulse: 75   SpO2: 98%      Physical Exam  Vitals and nursing note reviewed.   Constitutional:       General: She is not in acute distress.     Appearance: Normal appearance. She is obese. She is not ill-appearing.   HENT:      Head: Normocephalic and atraumatic.   Eyes:      General: No scleral icterus.        Right eye: No discharge.         Left eye: No discharge.   Cardiovascular:      Rate and Rhythm: Normal rate and regular rhythm.      Pulses: Normal pulses.      Heart sounds: Normal heart sounds. No murmur heard.  Pulmonary:      Effort: Pulmonary effort is normal. No respiratory distress.      Breath sounds: Normal breath sounds. No stridor. No wheezing.   Musculoskeletal:      Cervical back: Normal range of motion and neck supple. No rigidity.      Right lower leg: No edema.      Left lower leg: No edema.   Neurological:      Mental Status: She is alert and oriented to person, place, and time.      Gait: Gait normal.   Psychiatric:         Mood and Affect: Mood normal.         Behavior: Behavior normal.         Thought Content: Thought content normal.         Judgment: Judgment normal.           Portions of the record may have been created with voice recognition software. Occasional wrong word or \"sound alike\" substitutions may have occurred due to the inherent limitations of voice recognition software. Please review the chart carefully and recognize, using context, where substitutions/typographical errors may " have occurred.

## 2024-04-03 ENCOUNTER — OFFICE VISIT (OUTPATIENT)
Dept: FAMILY MEDICINE CLINIC | Facility: HOSPITAL | Age: 49
End: 2024-04-03
Payer: COMMERCIAL

## 2024-04-03 VITALS
HEART RATE: 84 BPM | DIASTOLIC BLOOD PRESSURE: 82 MMHG | BODY MASS INDEX: 53.24 KG/M2 | HEIGHT: 61 IN | SYSTOLIC BLOOD PRESSURE: 141 MMHG | OXYGEN SATURATION: 96 % | WEIGHT: 282 LBS

## 2024-04-03 DIAGNOSIS — Z13.1 SCREENING FOR DIABETES MELLITUS: ICD-10-CM

## 2024-04-03 DIAGNOSIS — Z00.00 ROUTINE ADULT HEALTH MAINTENANCE: Primary | ICD-10-CM

## 2024-04-03 DIAGNOSIS — Z90.710 S/P HYSTERECTOMY: ICD-10-CM

## 2024-04-03 DIAGNOSIS — Z11.59 NEED FOR HEPATITIS C SCREENING TEST: ICD-10-CM

## 2024-04-03 DIAGNOSIS — E66.01 CLASS 3 SEVERE OBESITY DUE TO EXCESS CALORIES WITHOUT SERIOUS COMORBIDITY WITH BODY MASS INDEX (BMI) OF 50.0 TO 59.9 IN ADULT (HCC): ICD-10-CM

## 2024-04-03 DIAGNOSIS — Z13.0 SCREENING FOR IRON DEFICIENCY ANEMIA: ICD-10-CM

## 2024-04-03 DIAGNOSIS — Z13.29 SCREENING FOR THYROID DISORDER: ICD-10-CM

## 2024-04-03 DIAGNOSIS — Z13.220 SCREENING FOR HYPERLIPIDEMIA: ICD-10-CM

## 2024-04-03 PROCEDURE — 99396 PREV VISIT EST AGE 40-64: CPT | Performed by: STUDENT IN AN ORGANIZED HEALTH CARE EDUCATION/TRAINING PROGRAM

## 2024-04-03 NOTE — PATIENT INSTRUCTIONS
[facility] - [phone]   @   [address] [place], [address]

## 2024-04-03 NOTE — PROGRESS NOTES
ADULT ANNUAL PHYSICAL  [facility] PRIMARY CARE [address]    NAME: Miki Morgan  AGE: 49 y.o. SEX: female  : 1975      Assessment and Plan:     Problem List Items Addressed This Visit        Other    Obesity   Other Visit Diagnoses     Routine adult health maintenance    -  Primary    Screening for diabetes mellitus        Relevant Orders    Comprehensive metabolic panel    Screening for iron deficiency anemia        Relevant Orders    CBC and Platelet    Screening for hyperlipidemia        Relevant Orders    Lipid Panel with Direct LDL reflex    Screening for thyroid disorder        Relevant Orders    TSH, 3rd generation    Need for hepatitis C screening test        Relevant Orders    Hepatitis C Ab W/Refl To HCV RNA, Qn, PCR    S/P hysterectomy        Relevant Orders    Luteinizing hormone    Follicle stimulating hormone        Have fasting labs done. Will assess hormone levels. Hx of low K+ & Ca++  Get mammo records.    Schedule with GI this yr.     Immunizations and preventive care screenings were discussed with patient today. Appropriate education was printed on patient's after visit summary.    Counseling:  Alcohol/drug use: discussed moderation in alcohol intake, the recommendations for healthy alcohol use, and avoidance of illicit drug use.  Dental Health: discussed importance of regular tooth brushing, flossing, and dental visits.  Injury prevention: discussed safety/seat belts, safety helmets, smoke detectors, carbon dioxide detectors, and smoking near bedding or upholstery.  Sexual health: discussed sexually transmitted diseases, partner selection, use of condoms, avoidance of unintended pregnancy, and contraceptive alternatives.  Exercise: the importance of regular exercise/physical activity was discussed. Recommend exercise 3-5 times per week for at least 30 minutes.      Return in about 6 months (around 10/3/2024) for F/U Chronic Conditions.      Chief Complaint:     Chief Complaint   Patient presents with   • Physical Exam      History of Present Illness:     Adult Annual Physical   Patient here for a comprehensive physical exam.     R Knee - torn meniscus had surgery   L knee XR - sprain MCL    Diet and Physical Activity  Diet/Nutrition:  regular .   Exercise: no formal exercise.   No Drug use.   Tobacco use:  reports that she has never smoked. She has never used smokeless tobacco.  Alcohol use - rare    Wt Readings from Last 3 Encounters:   24 128 kg (282 lb)   24 122 kg (269 lb)   22 134 kg (295 lb)     Temp Readings from Last 3 Encounters:   22 98.9 °F (37.2 °C) (Temporal)   22 98.5 °F (36.9 °C)   21 98 °F (36.7 °C) (Temporal)     BP Readings from Last 3 Encounters:   24 141/82   24 138/82   22 152/85     Pulse Readings from Last 3 Encounters:   24 84   24 75   22 86     General Health  Sleep:  goes to sleep late, at work by 6 am , falls asleep at sofa then upstairs eventually.   Hearing: normal - bilateral.  Vision: goes for regular eye exams and wears glasses.   Dental: regular dental visits and brushes teeth twice daily.       /GYN Health  Follows with gynecology? yes   Had partial hysterectomy - sees [facility]     Review of Systems:     Review of Systems   Constitutional:  Negative for chills and fever.   Respiratory:  Negative for cough and shortness of breath.    Cardiovascular:  Negative for chest pain and palpitations.   Musculoskeletal:  Positive for arthralgias (b/l knees).   Neurological:  Negative for dizziness, light-headedness and headaches.      Past Medical History:     Past Medical History:   Diagnosis Date   • Anxiety 2024   • Obesity 2024      Past Surgical History:     Past Surgical History:   Procedure Laterality Date   •  SECTION  2006, 11   • HYSTERECTOMY      Partial i still have my ovaries   • KNEE SURGERY      Meniscus tear   •  "TUBAL LIGATION  8-23-11      Social History:     Social History     Socioeconomic History   • Marital status: /Civil Union     Spouse name: None   • Number of children: None   • Years of education: None   • Highest education level: None   Occupational History   • None   Tobacco Use   • Smoking status: Never   • Smokeless tobacco: Never   Vaping Use   • Vaping status: Never Used   Substance and Sexual Activity   • Alcohol use: Not Currently     Alcohol/week: 1.0 standard drink of alcohol     Types: 1 Glasses of wine per week     Comment: Its more like 1 a month not a week   • Drug use: Not Currently     Types: Cocaine, Marijuana     Comment: I have not used since I was in my late teens   • Sexual activity: Yes     Partners: Male     Birth control/protection: Female Sterilization   Other Topics Concern   • None   Social History Narrative   • None     Social Determinants of Health     Financial Resource Strain: Not on file   Food Insecurity: Not on file   Transportation Needs: Not on file   Physical Activity: Not on file   Stress: Not on file   Social Connections: Not on file   Intimate Partner Violence: Not on file   Housing Stability: Not on file      Family History:     Family History   Problem Relation Age of Onset   • Colon cancer Mother    • Diabetes Maternal Grandmother    • Diabetes Paternal Grandmother       Current Medications:     Current Outpatient Medications   Medication Sig Dispense Refill   • Cholecalciferol (Vitamin D3) 1.25 MG (22050 UT) CAPS      • solifenacin (VESICARE) 5 mg tablet Take 5 mg by mouth daily       No current facility-administered medications for this visit.      Allergies:     No Known Allergies   Physical Exam:     /82   Pulse 84   Ht 5' 1\" (1.549 m)   Wt 128 kg (282 lb)   SpO2 96%   BMI 53.28 kg/m²     Physical Exam  Vitals reviewed.   Constitutional:       General: She is not in acute distress.     Appearance: Normal appearance. She is obese. She is not " ill-appearing.   HENT:      Head: Normocephalic and atraumatic.      Right Ear: Tympanic membrane, ear canal and external ear normal. There is no impacted cerumen.      Left Ear: Tympanic membrane, ear canal and external ear normal. There is no impacted cerumen.      Nose: Nose normal. No congestion or rhinorrhea.      Mouth/Throat:      Mouth: Mucous membranes are moist.      Pharynx: Oropharynx is clear. No oropharyngeal exudate or posterior oropharyngeal erythema.   Eyes:      General: No scleral icterus.        Right eye: No discharge.         Left eye: No discharge.      Conjunctiva/sclera: Conjunctivae normal.   Neck:      Comments: No thyroid nodules or thyromegaly.  Cardiovascular:      Rate and Rhythm: Normal rate and regular rhythm.      Pulses: Normal pulses.      Heart sounds: Normal heart sounds. No murmur heard.     No friction rub. No gallop.   Pulmonary:      Effort: Pulmonary effort is normal. No respiratory distress.      Breath sounds: Normal breath sounds. No stridor. No wheezing.   Musculoskeletal:         General: No swelling or tenderness. Normal range of motion.      Cervical back: Normal range of motion and neck supple. No rigidity or tenderness.      Right lower leg: No edema.      Left lower leg: No edema.   Lymphadenopathy:      Cervical: No cervical adenopathy.   Skin:     General: Skin is warm and dry.      Capillary Refill: Capillary refill takes less than 2 seconds.      Coloration: Skin is not jaundiced or pale.   Neurological:      Mental Status: She is alert and oriented to person, place, and time.      Gait: Gait normal.   Psychiatric:         Mood and Affect: Mood normal.         Behavior: Behavior normal.         Thought Content: Thought content normal.         Judgment: Judgment normal.        Tara Knapp DO  [facility] PRIMARY CARE [address]

## 2024-08-22 ENCOUNTER — OFFICE VISIT (OUTPATIENT)
Dept: GASTROENTEROLOGY | Facility: CLINIC | Age: 49
End: 2024-08-22
Payer: COMMERCIAL

## 2024-08-22 VITALS
WEIGHT: 293 LBS | DIASTOLIC BLOOD PRESSURE: 82 MMHG | BODY MASS INDEX: 55.32 KG/M2 | SYSTOLIC BLOOD PRESSURE: 142 MMHG | HEIGHT: 61 IN

## 2024-08-22 DIAGNOSIS — Z12.11 SCREENING FOR COLON CANCER: ICD-10-CM

## 2024-08-22 DIAGNOSIS — Z80.0 FAMILY HISTORY OF COLON CANCER: Primary | ICD-10-CM

## 2024-08-22 PROCEDURE — 99203 OFFICE O/P NEW LOW 30 MIN: CPT | Performed by: NURSE PRACTITIONER

## 2024-08-22 NOTE — PROGRESS NOTES
[facility] - Outpatient Consultation  Miki Morgan 49 y.o. female MRN: [id number]  Encounter: [id number]    ASSESSMENT AND PLAN:      1. Screening for colon cancer  Presents to schedule initial screening colonoscopy  Family history of colon cancer in her mother, diagnosed with advanced metastatic cancer at age 72  Denies recent change in bowel habits, no alarm symptoms  -Scheduled for colonoscopy at [facility] due to BMI 56.1  -Reviewed clear liquid diet and colon prep  - Ambulatory Referral to Gastroenterology      Followup Appointment: As needed  ______________________________________________________________________    Chief Complaint   Patient presents with    Colonoscopy     Routine, family hx colon cancer in mother       HPI:   Miki Morgan is a 49 y.o. year old female who presents to schedule initial screening colonoscopy  Denies recent acute change in bowel habits, reports formed bowel movement every 1 to 2 days.  Denies melena or rectal bleeding  No rectal or abdominal pain    Denies dysphagia, no GERD symptoms  Appetite is good and her weight is stable    No anticoagulation  Family history of colon cancer in her mother who was diagnosed at age 72 with advanced metastatic colon cancer, passed away 6 weeks after diagnosis    Historical Information   Past Medical History:   Diagnosis Date    Anxiety 2024    Obesity 2024     Past Surgical History:   Procedure Laterality Date     SECTION  2006, 11    HYSTERECTOMY      Partial i still have my ovaries    KNEE SURGERY      Meniscus tear    TUBAL LIGATION  11     Social History     Substance and Sexual Activity   Alcohol Use Not Currently    Alcohol/week: 1.0 standard drink of alcohol    Types: 1 Glasses of wine per week    Comment: Its more like 1 a month not a week     Social History     Substance and Sexual Activity   Drug Use Not Currently    Types: Cocaine, Marijuana     "Comment: I have not used since I was in my late teens     Social History     Tobacco Use   Smoking Status Never   Smokeless Tobacco Never     Family History   Problem Relation Age of Onset    Colon cancer Mother     Arthritis Mother     Diabetes Maternal Grandmother     Diabetes Paternal Grandmother        Meds/Allergies     Current Outpatient Medications:     Cholecalciferol (Vitamin D3) 1.25 MG (25105 UT) CAPS    solifenacin (VESICARE) 5 mg tablet    No Known Allergies    PHYSICAL EXAM:    Blood pressure 142/82, height 5' 1\" (1.549 m), weight 135 kg (297 lb). Body mass index is 56.12 kg/m².  General Appearance: NAD, cooperative, alert  Eyes: Anicteric  ENT:  Normocephalic, atraumatic, normal mucosa.    Neck:  Supple, symmetrical, trachea midline,   Resp:  Clear to auscultation bilaterally; no rales, rhonchi or wheezing; respirations unlabored   CV:  S1 S2, Regular rate and rhythm; no murmur, rub, or gallop.  GI:  Soft, non-tender, non-distended; normal bowel sounds; no masses, no organomegaly   Rectal: Deferred  Musculoskeletal: No cyanosis, clubbing or edema. Normal ROM.  Skin:  No jaundice, rashes, or lesions   Psych: Normal affect, good eye contact  Neuro: No gross deficits, AAOx3    Lab Results:   Lab Results   Component Value Date    WBC 11.53 (H) 09/02/2022    HGB 12.6 09/02/2022    HCT 40.8 09/02/2022    MCV 86 09/02/2022     09/02/2022     Lab Results   Component Value Date    K 2.7 (LL) 09/02/2022     (H) 09/02/2022    CO2 22 09/02/2022    BUN 6 09/02/2022    CREATININE 0.41 (L) 09/02/2022    CALCIUM 6.7 (L) 09/02/2022    CORRECTEDCA 7.9 (L) 09/02/2022    AST 11 09/02/2022    ALT 28 09/02/2022    ALKPHOS 60 09/02/2022    EGFR 124 09/02/2022           REVIEW OF SYSTEMS:    CONSTITUTIONAL: Denies any fever, chills, rigors, and weight loss.  HEENT: No earache or tinnitus. Denies hearing loss or visual disturbances.  CARDIOVASCULAR: No chest pain or palpitations.   RESPIRATORY: Denies any cough, " hemoptysis, shortness of breath or dyspnea on exertion.  GASTROINTESTINAL: As noted in the History of Present Illness.   GENITOURINARY: No problems with urination. Denies any hematuria or dysuria.  NEUROLOGIC: No dizziness or vertigo, denies headaches.   MUSCULOSKELETAL: Denies any muscle or joint pain.   SKIN: Denies skin rashes or itching.   ENDOCRINE: Denies excessive thirst. Denies intolerance to heat or cold.  PSYCHOSOCIAL: Denies depression or anxiety. Denies any recent memory loss.

## 2024-09-29 LAB
ALBUMIN SERPL-MCNC: 4 G/DL (ref 3.6–5.1)
ALBUMIN/GLOB SERPL: 1.5 (CALC) (ref 1–2.5)
ALP SERPL-CCNC: 75 U/L (ref 31–125)
ALT SERPL-CCNC: 28 U/L (ref 6–29)
AST SERPL-CCNC: 18 U/L (ref 10–35)
BILIRUB SERPL-MCNC: 0.6 MG/DL (ref 0.2–1.2)
BUN SERPL-MCNC: 11 MG/DL (ref 7–25)
BUN/CREAT SERPL: NORMAL (CALC) (ref 6–22)
CALCIUM SERPL-MCNC: 9.5 MG/DL (ref 8.6–10.2)
CHLORIDE SERPL-SCNC: 107 MMOL/L (ref 98–110)
CHOLEST SERPL-MCNC: 169 MG/DL
CHOLEST/HDLC SERPL: 2.5 (CALC)
CO2 SERPL-SCNC: 27 MMOL/L (ref 20–32)
CREAT SERPL-MCNC: 0.52 MG/DL (ref 0.5–0.99)
ERYTHROCYTE [DISTWIDTH] IN BLOOD BY AUTOMATED COUNT: 13 % (ref 11–15)
FSH SERPL-ACNC: 27.6 MIU/ML
GFR/BSA.PRED SERPLBLD CYS-BASED-ARV: 115 ML/MIN/1.73M2
GLOBULIN SER CALC-MCNC: 2.6 G/DL (CALC) (ref 1.9–3.7)
GLUCOSE SERPL-MCNC: 98 MG/DL (ref 65–99)
HCT VFR BLD AUTO: 41.7 % (ref 35–45)
HCV AB SERPL QL IA: NORMAL
HDLC SERPL-MCNC: 67 MG/DL
HGB BLD-MCNC: 13.2 G/DL (ref 11.7–15.5)
LDLC SERPL CALC-MCNC: 88 MG/DL (CALC)
LH SERPL-ACNC: 10 MIU/ML
MCH RBC QN AUTO: 27.6 PG (ref 27–33)
MCHC RBC AUTO-ENTMCNC: 31.7 G/DL (ref 32–36)
MCV RBC AUTO: 87.2 FL (ref 80–100)
NONHDLC SERPL-MCNC: 102 MG/DL (CALC)
PLATELET # BLD AUTO: 341 THOUSAND/UL (ref 140–400)
PMV BLD REES-ECKER: 11 FL (ref 7.5–12.5)
POTASSIUM SERPL-SCNC: 4.5 MMOL/L (ref 3.5–5.3)
PROT SERPL-MCNC: 6.6 G/DL (ref 6.1–8.1)
RBC # BLD AUTO: 4.78 MILLION/UL (ref 3.8–5.1)
SODIUM SERPL-SCNC: 141 MMOL/L (ref 135–146)
TRIGL SERPL-MCNC: 62 MG/DL
TSH SERPL-ACNC: 0.65 MIU/L
WBC # BLD AUTO: 9.1 THOUSAND/UL (ref 3.8–10.8)

## 2024-10-07 ENCOUNTER — OFFICE VISIT (OUTPATIENT)
Dept: FAMILY MEDICINE CLINIC | Facility: HOSPITAL | Age: 49
End: 2024-10-07
Payer: COMMERCIAL

## 2024-10-07 VITALS
HEART RATE: 87 BPM | DIASTOLIC BLOOD PRESSURE: 68 MMHG | HEIGHT: 61 IN | WEIGHT: 293 LBS | BODY MASS INDEX: 55.32 KG/M2 | SYSTOLIC BLOOD PRESSURE: 112 MMHG | OXYGEN SATURATION: 96 %

## 2024-10-07 DIAGNOSIS — E66.813 CLASS 3 SEVERE OBESITY DUE TO EXCESS CALORIES WITHOUT SERIOUS COMORBIDITY WITH BODY MASS INDEX (BMI) OF 50.0 TO 59.9 IN ADULT (HCC): ICD-10-CM

## 2024-10-07 DIAGNOSIS — F41.9 ANXIETY: ICD-10-CM

## 2024-10-07 DIAGNOSIS — R60.0 MILD PERIPHERAL EDEMA: ICD-10-CM

## 2024-10-07 DIAGNOSIS — M79.672 BILATERAL FOOT PAIN: ICD-10-CM

## 2024-10-07 DIAGNOSIS — D18.03 HEMANGIOMA OF INTRA-ABDOMINAL STRUCTURE: ICD-10-CM

## 2024-10-07 DIAGNOSIS — E66.01 CLASS 3 SEVERE OBESITY DUE TO EXCESS CALORIES WITHOUT SERIOUS COMORBIDITY WITH BODY MASS INDEX (BMI) OF 50.0 TO 59.9 IN ADULT (HCC): ICD-10-CM

## 2024-10-07 DIAGNOSIS — Z72.820 POOR SLEEP: ICD-10-CM

## 2024-10-07 DIAGNOSIS — M21.42 PES PLANUS OF BOTH FEET: ICD-10-CM

## 2024-10-07 DIAGNOSIS — Z12.39 ENCOUNTER FOR SCREENING FOR MALIGNANT NEOPLASM OF BREAST, UNSPECIFIED SCREENING MODALITY: ICD-10-CM

## 2024-10-07 DIAGNOSIS — M21.41 PES PLANUS OF BOTH FEET: ICD-10-CM

## 2024-10-07 DIAGNOSIS — Z78.0 POSTMENOPAUSAL: ICD-10-CM

## 2024-10-07 DIAGNOSIS — M79.671 BILATERAL FOOT PAIN: ICD-10-CM

## 2024-10-07 DIAGNOSIS — Z00.00 ROUTINE ADULT HEALTH MAINTENANCE: Primary | ICD-10-CM

## 2024-10-07 DIAGNOSIS — R06.83 LOUD SNORING: ICD-10-CM

## 2024-10-07 LAB — SL AMB POCT HEMOGLOBIN AIC: 5.6 (ref ?–6.5)

## 2024-10-07 PROCEDURE — 99396 PREV VISIT EST AGE 40-64: CPT | Performed by: STUDENT IN AN ORGANIZED HEALTH CARE EDUCATION/TRAINING PROGRAM

## 2024-10-07 PROCEDURE — 83036 HEMOGLOBIN GLYCOSYLATED A1C: CPT | Performed by: STUDENT IN AN ORGANIZED HEALTH CARE EDUCATION/TRAINING PROGRAM

## 2024-10-07 PROCEDURE — 99214 OFFICE O/P EST MOD 30 MIN: CPT | Performed by: STUDENT IN AN ORGANIZED HEALTH CARE EDUCATION/TRAINING PROGRAM

## 2024-10-07 NOTE — ASSESSMENT & PLAN NOTE
High suspicion for SIN given her short stature, high weight, and thus high BMI.  Sleep medicine referral placed.  Orders:    Ambulatory Referral to Sleep Medicine; Future    POCT hemoglobin A1c

## 2024-10-07 NOTE — ASSESSMENT & PLAN NOTE
Follows with counselor. OCD like at times, not an issue in the past.   In the past on zoloft. No meds right now.

## 2024-10-07 NOTE — ASSESSMENT & PLAN NOTE
US LUQ - 2023 - Small splenic hemangioma suspected.   CT Abd/Pel - 2022 - represent benign hemangioma or lymphangioma.  Conservative management with follow-up ultrasound in 3-6 months to reevaluate for size stability is recommended.

## 2024-10-07 NOTE — PROGRESS NOTES
Ambulatory Visit  Name: Miki Morgan      : 1975      MRN: [id number]  Encounter Provider: Tara Knapp DO  Encounter Date: 10/7/2024   Encounter department: [facility] PRIMARY CARE [address]    Assessment & Plan  Routine adult health maintenance  Topics reviewed as below, gynecology referral provided.  Vaccines reviewed.  Medications reviewed and reconciled.       Encounter for screening for malignant neoplasm of breast, unspecified screening modality  Due & ordered.   Orders:    Mammo screening bilateral w 3d and cad; Future    Postmenopausal  FSH - 27, appears to be in menopause. Occas hot flashes, racing thoughts, brain fog.   Possibly OCD tendencies. Thinks become stuck in her head.   Pt did have partial hysterectomy.   Schedule with GYN, wants to switch to [facility]> Ref & names given.   Orders:    Ambulatory Referral to Obstetrics / Gynecology; Future    Anxiety  Follows with counselor. OCD like at times, not an issue in the past.   In the past on zoloft. No meds right now.      Class 3 severe obesity due to excess calories without serious comorbidity with body mass index (BMI) of 50.0 to 59.9 in adult (HCC)  High suspicion for SIN given her short stature, high weight, and thus high BMI.  Sleep medicine referral placed.  Orders:    Ambulatory Referral to Sleep Medicine; Future    POCT hemoglobin A1c    Poor sleep  Orders:    Ambulatory Referral to Sleep Medicine; Future    Loud snoring  Orders:    Ambulatory Referral to Sleep Medicine; Future    Hemangioma of intra-abdominal structure  US LUQ -  - Small splenic hemangioma suspected.   CT Abd/Pel -  - represent benign hemangioma or lymphangioma.  Conservative management with follow-up ultrasound in 3-6 months to reevaluate for size stability is recommended.       Bilateral foot pain  Orders:    Ambulatory Referral to Podiatry; Future    Mild peripheral edema  Consider wearing compressive socks.  Orders:    Ambulatory Referral to  "Podiatry; Future    Pes planus of both feet  Patient provided with referral for podiatry.  Also discussed over-the-counter orthotics like Dr. Martinez, or Je.  Try to be in arch supportive shoes daily  Orders:    Ambulatory Referral to Podiatry; Future    Return in about 4 weeks (around 11/4/2024) for F/U Chronic DX.     History of Present Illness     HPI  History obtained from : patient  Was sick w a cold a few weeks ago.     Did have labs done.     Colonoscopy in Dec.     Review of Systems   Constitutional:  Negative for chills and fever.   Respiratory:  Negative for cough and shortness of breath.    Cardiovascular:  Negative for chest pain and palpitations.   Musculoskeletal:  Positive for arthralgias.   Neurological:  Negative for dizziness, light-headedness and headaches.       Current Outpatient Medications on File Prior to Visit   Medication Sig Dispense Refill    Cholecalciferol (Vitamin D3) 1.25 MG (17185 UT) CAPS       solifenacin (VESICARE) 5 mg tablet Take 5 mg by mouth daily (Patient not taking: Reported on 8/22/2024)       No current facility-administered medications on file prior to visit.      Social History     Tobacco Use    Smoking status: Never    Smokeless tobacco: Never   Vaping Use    Vaping status: Never Used   Substance and Sexual Activity    Alcohol use: Not Currently     Alcohol/week: 1.0 standard drink of alcohol     Types: 1 Glasses of wine per week     Comment: Its more like 1 a month not a week    Drug use: Not Currently     Types: Cocaine, Marijuana     Comment: I have not used since I was in my late teens    Sexual activity: Yes     Partners: Male     Birth control/protection: Female Sterilization     Objective     /68   Pulse 87   Ht 5' 1\" (1.549 m)   Wt 136 kg (299 lb 3.2 oz)   SpO2 96%   BMI 56.53 kg/m²     Physical Exam  Vitals and nursing note reviewed.   Constitutional:       General: She is not in acute distress.     Appearance: Normal appearance. She is " well-developed and well-groomed. She is morbidly obese. She is not ill-appearing.   HENT:      Head: Normocephalic and atraumatic.      Right Ear: Tympanic membrane, ear canal and external ear normal. There is no impacted cerumen.      Left Ear: Tympanic membrane, ear canal and external ear normal. There is no impacted cerumen.      Nose: Nose normal. No congestion.      Mouth/Throat:      Mouth: Mucous membranes are moist.      Pharynx: Oropharynx is clear. No oropharyngeal exudate.   Eyes:      General: No scleral icterus.        Right eye: No discharge.         Left eye: No discharge.      Conjunctiva/sclera: Conjunctivae normal.   Neck:      Comments: Large neck diameter  Cardiovascular:      Rate and Rhythm: Normal rate and regular rhythm.      Pulses: Normal pulses.      Heart sounds: Normal heart sounds. No murmur heard.  Pulmonary:      Effort: Pulmonary effort is normal. No respiratory distress.      Breath sounds: Normal breath sounds.   Musculoskeletal:      Cervical back: Normal range of motion and neck supple. No rigidity.      Right lower leg: No edema.      Left lower leg: No edema.   Skin:     General: Skin is warm and dry.      Capillary Refill: Capillary refill takes less than 2 seconds.   Neurological:      Mental Status: She is alert and oriented to person, place, and time.      Gait: Gait normal.   Psychiatric:         Mood and Affect: Mood normal.         Behavior: Behavior normal. Behavior is cooperative.         Thought Content: Thought content normal.         Judgment: Judgment normal.

## 2024-10-09 ENCOUNTER — TRANSCRIBE ORDERS (OUTPATIENT)
Dept: SLEEP CENTER | Facility: CLINIC | Age: 49
End: 2024-10-09

## 2024-10-09 DIAGNOSIS — R06.83 LOUD SNORING: ICD-10-CM

## 2024-10-09 DIAGNOSIS — E66.813 CLASS 3 SEVERE OBESITY DUE TO EXCESS CALORIES WITHOUT SERIOUS COMORBIDITY WITH BODY MASS INDEX (BMI) OF 50.0 TO 59.9 IN ADULT (HCC): Primary | ICD-10-CM

## 2024-10-09 DIAGNOSIS — Z72.820 POOR SLEEP: ICD-10-CM

## 2024-10-09 DIAGNOSIS — E66.01 CLASS 3 SEVERE OBESITY DUE TO EXCESS CALORIES WITHOUT SERIOUS COMORBIDITY WITH BODY MASS INDEX (BMI) OF 50.0 TO 59.9 IN ADULT (HCC): Primary | ICD-10-CM

## 2024-10-15 ENCOUNTER — OFFICE VISIT (OUTPATIENT)
Dept: URGENT CARE | Facility: CLINIC | Age: 49
End: 2024-10-15
Payer: COMMERCIAL

## 2024-10-15 VITALS
OXYGEN SATURATION: 95 % | BODY MASS INDEX: 55.13 KG/M2 | DIASTOLIC BLOOD PRESSURE: 88 MMHG | TEMPERATURE: 97.9 F | RESPIRATION RATE: 18 BRPM | SYSTOLIC BLOOD PRESSURE: 152 MMHG | WEIGHT: 292 LBS | HEART RATE: 92 BPM | HEIGHT: 61 IN

## 2024-10-15 DIAGNOSIS — H65.03 NON-RECURRENT ACUTE SEROUS OTITIS MEDIA OF BOTH EARS: Primary | ICD-10-CM

## 2024-10-15 PROCEDURE — G0382 LEV 3 HOSP TYPE B ED VISIT: HCPCS | Performed by: FAMILY MEDICINE

## 2024-10-15 RX ORDER — AMOXICILLIN 500 MG/1
500 CAPSULE ORAL EVERY 12 HOURS SCHEDULED
Qty: 20 CAPSULE | Refills: 0 | Status: SHIPPED | OUTPATIENT
Start: 2024-10-15 | End: 2024-10-25

## 2024-10-15 RX ORDER — METHYLPREDNISOLONE 4 MG/1
TABLET ORAL
Qty: 21 TABLET | Refills: 0 | Status: SHIPPED | OUTPATIENT
Start: 2024-10-15

## 2024-10-15 NOTE — PROGRESS NOTES
[facility]        NAME: Miki Morgan is a 49 y.o. female  : 1975    MRN: [id number]  DATE: October 15, 2024  TIME: 10:45 AM    Assessment and Plan   Non-recurrent acute serous otitis media of both ears [H65.03]  1. Non-recurrent acute serous otitis media of both ears  amoxicillin (AMOXIL) 500 mg capsule    methylPREDNISolone 4 MG tablet therapy pack            Patient Instructions       Follow up with PCP in 3-5 days.  Proceed to  ER if symptoms worsen.    If tests have been performed at [facility], our office will contact you with results if changes need to be made to the care plan discussed with you at the visit.  You can review your full results on [facility].    Chief Complaint     Chief Complaint   Patient presents with    Earache     Pt presents with bilateral ear pain, chest congestion, dry cough, and post nasal drip x 4 days.  Pt states fever on day one of 102 that has since resolved.  Pt has been taking Mucinex for sympotm relief.          History of Present Illness       49-year-old female with 3-day history of bilateral ear pain, cough and chest congestion.  She also reports a fever of 102 2 days ago.  Denies any headaches, chest tightness or shortness of breath.  Denies any nausea or vomiting.        Review of Systems   Review of Systems   Constitutional:  Positive for fever.   HENT:  Positive for congestion and ear pain.    Eyes: Negative.    Respiratory:  Positive for cough.    Cardiovascular: Negative.    Gastrointestinal: Negative.    Genitourinary: Negative.    Musculoskeletal:  Positive for arthralgias and myalgias.   Skin: Negative.    Allergic/Immunologic: Negative.    Neurological: Negative.    Hematological: Negative.    Psychiatric/Behavioral: Negative.           Current Medications       Current Outpatient Medications:     amoxicillin (AMOXIL) 500 mg capsule, Take 1 capsule (500 mg total) by mouth every 12 (twelve) hours for 10 days, Disp: 20 capsule, Rfl: 0     "Cholecalciferol (Vitamin D3) 1.25 MG (57676 UT) CAPS, , Disp: , Rfl:     methylPREDNISolone 4 MG tablet therapy pack, Use as directed on package, Disp: 21 tablet, Rfl: 0    Current Allergies     Allergies as of 10/15/2024    (No Known Allergies)            The following portions of the patient's history were reviewed and updated as appropriate: allergies, current medications, past family history, past medical history, past social history, past surgical history and problem list.     Past Medical History:   Diagnosis Date    Anxiety 2024    Obesity 2024       Past Surgical History:   Procedure Laterality Date     SECTION  2006, 11    HYSTERECTOMY      Partial i still have my ovaries    KNEE SURGERY      Meniscus tear    TUBAL LIGATION  11       Family History   Problem Relation Age of Onset    Colon cancer Mother     Arthritis Mother     Diabetes Maternal Grandmother     Diabetes Paternal Grandmother          Medications have been verified.        Objective   /88   Pulse 92   Temp 97.9 °F (36.6 °C)   Resp 18   Ht 5' 1\" (1.549 m)   Wt 132 kg (292 lb)   SpO2 95%   BMI 55.17 kg/m²   No LMP recorded. Patient has had a hysterectomy.       Physical Exam     Physical Exam  Vitals and nursing note reviewed.   Constitutional:       Appearance: She is well-developed.   HENT:      Head: Normocephalic.      Right Ear: Tympanic membrane is erythematous and bulging.      Left Ear: Tympanic membrane is erythematous and bulging.      Nose: Nose normal.   Eyes:      Pupils: Pupils are equal, round, and reactive to light.   Cardiovascular:      Rate and Rhythm: Normal rate.   Pulmonary:      Effort: Pulmonary effort is normal.   Abdominal:      General: Abdomen is flat.   Musculoskeletal:         General: Normal range of motion.      Cervical back: Normal range of motion.   Skin:     General: Skin is warm and dry.   Neurological:      Mental Status: She is alert and oriented to person, " place, and time.

## 2024-11-08 ENCOUNTER — OFFICE VISIT (OUTPATIENT)
Dept: FAMILY MEDICINE CLINIC | Facility: HOSPITAL | Age: 49
End: 2024-11-08
Payer: COMMERCIAL

## 2024-11-08 VITALS
BODY MASS INDEX: 55.32 KG/M2 | DIASTOLIC BLOOD PRESSURE: 104 MMHG | HEIGHT: 61 IN | OXYGEN SATURATION: 98 % | SYSTOLIC BLOOD PRESSURE: 189 MMHG | WEIGHT: 293 LBS | HEART RATE: 80 BPM

## 2024-11-08 DIAGNOSIS — E66.813 CLASS 3 SEVERE OBESITY DUE TO EXCESS CALORIES WITHOUT SERIOUS COMORBIDITY WITH BODY MASS INDEX (BMI) OF 50.0 TO 59.9 IN ADULT (HCC): Primary | ICD-10-CM

## 2024-11-08 DIAGNOSIS — R03.0 ELEVATED BLOOD PRESSURE READING IN OFFICE WITHOUT DIAGNOSIS OF HYPERTENSION: ICD-10-CM

## 2024-11-08 DIAGNOSIS — Z78.0 POSTMENOPAUSAL: ICD-10-CM

## 2024-11-08 DIAGNOSIS — E66.01 CLASS 3 SEVERE OBESITY DUE TO EXCESS CALORIES WITHOUT SERIOUS COMORBIDITY WITH BODY MASS INDEX (BMI) OF 50.0 TO 59.9 IN ADULT (HCC): Primary | ICD-10-CM

## 2024-11-08 DIAGNOSIS — R06.83 LOUD SNORING: ICD-10-CM

## 2024-11-08 DIAGNOSIS — Z72.820 POOR SLEEP: ICD-10-CM

## 2024-11-08 PROCEDURE — 99214 OFFICE O/P EST MOD 30 MIN: CPT | Performed by: STUDENT IN AN ORGANIZED HEALTH CARE EDUCATION/TRAINING PROGRAM

## 2024-11-08 RX ORDER — TIRZEPATIDE 2.5 MG/.5ML
2.5 INJECTION, SOLUTION SUBCUTANEOUS WEEKLY
Qty: 2 ML | Refills: 0 | Status: SHIPPED | OUTPATIENT
Start: 2024-11-08 | End: 2024-12-06

## 2024-11-08 NOTE — PATIENT INSTRUCTIONS
Mycoplasma bacterial pneumonia - Zpak.     Dr. Hai MOY & ZEPBOUND both approved for weight alone, not just diabetes

## 2024-11-08 NOTE — PROGRESS NOTES
[facility]   Tara Knapp DO    Assessment/Plan:      Diagnosis ICD-10-CM Associated Orders   1. Class 3 severe obesity due to excess calories without serious comorbidity with body mass index (BMI) of 50.0 to 59.9 in adult ([facility])  E66.813 tirzepatide (Zepbound) 2.5 mg/0.5 mL auto-injector    Z68.43     E66.01       2. Elevated blood pressure reading in office without diagnosis of hypertension  R03.0       3. Loud snoring  R06.83       4. Poor sleep  Z72.820       5. Postmenopausal  Z78.0         Labs again reviewed from Sept.   Start zepbound if able to be approved by insurance. High BMI, and high risk for HTN & DM2. A1c 5.6 recently. Pt also likely has SIN, not yet diagnosed. Getting scheduled for sleep study, loud snoring & fatigue.   More water & more exercise as able.   Calorie counting handout given for further info also.   Can call next week if cough/ sinus pressure not going away. Could do Zpak before upcoming colonoscopy ideally only if needed. May just need more time, steam, vicks, & mucinex.   Return in about 3 months (around 2/8/2025) for F/U Chronic DX.  Patient may call or return to office with any questions or concerns.   ______________________________________________________________________  Subjective:     Patient ID: Miki Morgan is a 49 y.o. female.  Miki Morgan  Chief Complaint   Patient presents with    Follow-up    Cough     X 1 month     Still dealing with cough.  10/15/24   Earache       Pt presents with bilateral ear pain, chest congestion, dry cough, and post nasal drip x 4 days.  Pt states fever on day one of 102 that has since resolved.  Pt has been taking Mucinex for sympotm relief.      Son sick first - amoxil. Then Daughter sick & ear infection & patient. Given amoxil & medrol lesli. Completed both - seemed to help ear infection & the cough some.   Had been hacking up bad wet coughs. No mucus coming out / swallows it if a problem.   More tickle feeling.   No cough  meds today.   Work was stressful.     Interested in weight loss meds, but worried about it messing other things up.     Calorie counting suzanne.     Prior Authorization Clinical Questions for Weight Management Pharmacotherapy    1. Does the patient have a contrainidcation to medication prescribed for weight management?: No  2. Does the patient have a diagnosis of obesity, confirmed by a BMI greater than or equal to 30 kg/m^2?: Yes  3. Does the patient have a BMI of greater than or equal to 27 kg/m^2 with at least one weight-related comorbidity/risk factor/complication (e.g. diabetes, dyslipidemia, coronary artery disease)?: No  5. Has the patient been on a weight loss regimen of low-calorie diet, increased physical activity, and lifestyle modifications for a minimum of 6 months?: Yes  6. Has the patient completed a comprehensive weight loss program (ie, Weight Watchers, Noom, Bariatrics, other suzanne on phone)? If so, what?: Yes   -- Q6 Further Explanation: Weight Watchers & Noom & Calorie Counter Suzanne   7. Does the patient have a history of type 2 diabetes?: No  8. Has the member tried and failed other weight loss medication within the past 12 months?: No  9. Will the member use requested medication in combination with another GLP agonist or weight loss drug?: No  10. Is the medication a controlled substance?: No     Baseline weight (in pounds): 300 lbs        Scheduled for colonoscopy 12/2.      Wt Readings from Last 3 Encounters:   11/08/24 (!) 136 kg (300 lb 9.6 oz)   10/15/24 132 kg (292 lb)   10/07/24 136 kg (299 lb 3.2 oz)     Temp Readings from Last 3 Encounters:   10/15/24 97.9 °F (36.6 °C)   09/02/22 98.9 °F (37.2 °C) (Temporal)   09/02/22 98.5 °F (36.9 °C)     BP Readings from Last 3 Encounters:   11/08/24 (!) 189/104   10/15/24 152/88   10/07/24 112/68     Pulse Readings from Last 3 Encounters:   11/08/24 80   10/15/24 92   10/07/24 87      The following portions of the patient's history were reviewed and  updated as appropriate: allergies, current medications, past medical history, and problem list.    Review of Systems   Constitutional:  Negative for chills and fever.   HENT:  Positive for sore throat. Negative for ear pain, rhinorrhea, sinus pressure and sinus pain.    Eyes:  Negative for pain and redness.   Respiratory:  Positive for cough. Negative for shortness of breath.    Cardiovascular:  Negative for chest pain and palpitations.   Neurological:  Positive for headaches. Negative for dizziness and light-headedness.   Psychiatric/Behavioral:  Positive for sleep disturbance (snoring a lot lately).        Objective:      Vitals:    11/08/24 1454   BP: (!) 189/104   Pulse: 80   SpO2: 98%      Physical Exam  Vitals and nursing note reviewed.   Constitutional:       General: She is not in acute distress.     Appearance: Normal appearance. She is well-developed and well-groomed. She is morbidly obese. She is not ill-appearing.   HENT:      Head: Normocephalic and atraumatic.      Comments: Sinus TTP     Right Ear: Tympanic membrane, ear canal and external ear normal. There is no impacted cerumen.      Left Ear: Tympanic membrane, ear canal and external ear normal. There is no impacted cerumen.      Ears:      Comments: Slightly mucoid TM's b/l     Nose: Nose normal. No congestion.      Mouth/Throat:      Mouth: Mucous membranes are moist.      Pharynx: Oropharynx is clear. No oropharyngeal exudate.   Eyes:      General: No scleral icterus.        Right eye: No discharge.         Left eye: No discharge.   Cardiovascular:      Rate and Rhythm: Normal rate and regular rhythm.      Pulses: Normal pulses.      Heart sounds: Normal heart sounds. No murmur heard.  Pulmonary:      Effort: Pulmonary effort is normal. No respiratory distress.      Breath sounds: Normal breath sounds. No stridor. No wheezing.   Musculoskeletal:      Cervical back: Normal range of motion and neck supple. No rigidity or tenderness.      Right  "lower leg: No edema.      Left lower leg: No edema.   Lymphadenopathy:      Cervical: No cervical adenopathy.   Neurological:      Mental Status: She is alert and oriented to person, place, and time.      Gait: Gait normal.   Psychiatric:         Mood and Affect: Mood normal.         Behavior: Behavior normal. Behavior is cooperative.         Thought Content: Thought content normal.         Judgment: Judgment normal.           Portions of the record may have been created with voice recognition software. Occasional wrong word or \"sound alike\" substitutions may have occurred due to the inherent limitations of voice recognition software. Please review the chart carefully and recognize, using context, where substitutions/typographical errors may have occurred.     "

## 2024-11-11 ENCOUNTER — TELEPHONE (OUTPATIENT)
Age: 49
End: 2024-11-11

## 2024-11-11 NOTE — TELEPHONE ENCOUNTER
PA for (Zepbound) 2.5 mg/0.5 mL EXCLUDED from plan       Reason:(Screenshot if applicable)        Message sent to office clinical pool Yes

## 2024-11-14 PROBLEM — H65.03 NON-RECURRENT ACUTE SEROUS OTITIS MEDIA OF BOTH EARS: Status: RESOLVED | Noted: 2024-10-15 | Resolved: 2024-11-14

## 2024-11-18 NOTE — TELEPHONE ENCOUNTER
Left detailed message that Zepbound was denied. Advised pt to call insurance co and see what is covered for the GLP-1.

## 2024-11-20 ENCOUNTER — TELEPHONE (OUTPATIENT)
Dept: GASTROENTEROLOGY | Facility: CLINIC | Age: 49
End: 2024-11-20

## 2024-11-21 NOTE — TELEPHONE ENCOUNTER
Procedure confirmed  Colonoscopy     Via: Voice mail    Instructions given: Given to Patient at Visit     Prep Given: Miralax/Dulcolax    Call the office if there are any questions.    Hold Zepbound 1 week prior, Do not take after 11-24-24

## 2024-12-02 ENCOUNTER — ANESTHESIA (OUTPATIENT)
Dept: GASTROENTEROLOGY | Facility: HOSPITAL | Age: 49
End: 2024-12-02
Payer: COMMERCIAL

## 2024-12-02 ENCOUNTER — HOSPITAL ENCOUNTER (OUTPATIENT)
Dept: GASTROENTEROLOGY | Facility: HOSPITAL | Age: 49
Setting detail: OUTPATIENT SURGERY
Discharge: HOME | End: 2024-12-02
Payer: COMMERCIAL

## 2024-12-02 ENCOUNTER — ANESTHESIA EVENT (OUTPATIENT)
Dept: GASTROENTEROLOGY | Facility: HOSPITAL | Age: 49
End: 2024-12-02
Payer: COMMERCIAL

## 2024-12-02 VITALS
HEART RATE: 80 BPM | TEMPERATURE: 97.7 F | BODY MASS INDEX: 56.31 KG/M2 | SYSTOLIC BLOOD PRESSURE: 159 MMHG | OXYGEN SATURATION: 100 % | WEIGHT: 293 LBS | DIASTOLIC BLOOD PRESSURE: 87 MMHG | RESPIRATION RATE: 20 BRPM

## 2024-12-02 DIAGNOSIS — Z80.0 FAMILY HISTORY OF COLON CANCER: ICD-10-CM

## 2024-12-02 DIAGNOSIS — Z12.11 SCREENING FOR COLON CANCER: ICD-10-CM

## 2024-12-02 PROCEDURE — 88305 TISSUE EXAM BY PATHOLOGIST: CPT | Performed by: PATHOLOGY

## 2024-12-02 PROCEDURE — 45380 COLONOSCOPY AND BIOPSY: CPT | Performed by: INTERNAL MEDICINE

## 2024-12-02 RX ORDER — PROPOFOL 10 MG/ML
INJECTION, EMULSION INTRAVENOUS AS NEEDED
Status: DISCONTINUED | OUTPATIENT
Start: 2024-12-02 | End: 2024-12-02

## 2024-12-02 RX ORDER — SODIUM CHLORIDE 9 MG/ML
INJECTION, SOLUTION INTRAVENOUS CONTINUOUS PRN
Status: DISCONTINUED | OUTPATIENT
Start: 2024-12-02 | End: 2024-12-02

## 2024-12-02 RX ADMIN — PROPOFOL 30 MG: 10 INJECTION, EMULSION INTRAVENOUS at 08:19

## 2024-12-02 RX ADMIN — PROPOFOL 50 MG: 10 INJECTION, EMULSION INTRAVENOUS at 08:05

## 2024-12-02 RX ADMIN — SODIUM CHLORIDE: 0.9 INJECTION, SOLUTION INTRAVENOUS at 07:15

## 2024-12-02 RX ADMIN — PROPOFOL 50 MG: 10 INJECTION, EMULSION INTRAVENOUS at 08:10

## 2024-12-02 RX ADMIN — PROPOFOL 50 MG: 10 INJECTION, EMULSION INTRAVENOUS at 08:06

## 2024-12-02 RX ADMIN — PROPOFOL 50 MG: 10 INJECTION, EMULSION INTRAVENOUS at 08:08

## 2024-12-02 RX ADMIN — PROPOFOL 50 MG: 10 INJECTION, EMULSION INTRAVENOUS at 08:13

## 2024-12-02 RX ADMIN — PROPOFOL 100 MG: 10 INJECTION, EMULSION INTRAVENOUS at 08:04

## 2024-12-02 RX ADMIN — PROPOFOL 50 MG: 10 INJECTION, EMULSION INTRAVENOUS at 08:15

## 2024-12-02 NOTE — ANESTHESIA PREPROCEDURE EVALUATION
Procedure:  COLONOSCOPY    Relevant Problems   NEURO/PSYCH   (+) Anxiety      Other   (+) Obesity (BMI 56)        Physical Exam    Airway    Mallampati score: II  TM Distance: >3 FB  Neck ROM: full     Dental   No notable dental hx     Cardiovascular      Pulmonary      Other Findings  post-pubertal.      Anesthesia Plan  ASA Score- 4     Anesthesia Type- IV sedation with anesthesia with ASA Monitors.         Additional Monitors:     Airway Plan:            Plan Factors-Exercise tolerance (METS): >4 METS.    Chart reviewed.    Patient summary reviewed.    Patient is not a current smoker.              Induction- intravenous.    Postoperative Plan-         Informed Consent- Anesthetic plan and risks discussed with patient.  I personally reviewed this patient with the CRNA. Discussed and agreed on the Anesthesia Plan with the CRNA..

## 2024-12-02 NOTE — ANESTHESIA POSTPROCEDURE EVALUATION
Post-Op Assessment Note    CV Status:  Stable  Pain Score: 0    Pain management: adequate       Mental Status:  Alert and awake   Hydration Status:  Euvolemic   PONV Controlled:  Controlled   Airway Patency:  Patent     Post Op Vitals Reviewed: Yes    No anethesia notable event occurred.    Staff: CRNA           Last Filed PACU Vitals:  Vitals Value Taken Time   Temp Rn temp    Pulse 86    /81    Resp 16    SpO2 98% 6Lo2 mask        Modified Abhay:  Activity: 2 (12/2/2024  7:17 AM)  Respiration: 2 (12/2/2024  7:17 AM)  Circulation: 2 (12/2/2024  7:17 AM)  Consciousness: 2 (12/2/2024  7:17 AM)  Oxygen Saturation: 2 (12/2/2024  7:17 AM)  Modified Abhay Score: 10 (12/2/2024  7:17 AM)

## 2024-12-02 NOTE — H&P
History and Physical - [facility]  Miki Morgan 49 y.o. female MRN: [id number]    HPI: Miki Morgan is a 49 y.o. female who presents for screening colonoscopy.  She has never had a colonoscopy.  Her mother did have colon cancer in her 70s    REVIEW OF SYSTEMS: Per the HPI, and otherwise unremarkable.    Historical Information   Past Medical History:   Diagnosis Date    Anxiety 2024    Obesity 2024     Past Surgical History:   Procedure Laterality Date     SECTION  2006, 11    HYSTERECTOMY      Partial i still have my ovaries    KNEE SURGERY      Meniscus tear    TUBAL LIGATION  12     Social History   Social History     Substance and Sexual Activity   Alcohol Use Not Currently    Alcohol/week: 1.0 standard drink of alcohol    Types: 1 Glasses of wine per week    Comment: Its more like 1 a month not a week     Social History     Substance and Sexual Activity   Drug Use Not Currently    Types: Cocaine, Marijuana    Comment: I have not used since I was in my late teens     Social History     Tobacco Use   Smoking Status Never   Smokeless Tobacco Never     Family History   Problem Relation Age of Onset    Colon cancer Mother     Arthritis Mother     Diabetes Maternal Grandmother     Diabetes Paternal Grandmother        Meds/Allergies       Current Outpatient Medications:     Cholecalciferol (Vitamin D3) 1.25 MG (86767 UT) CAPS    tirzepatide (Zepbound) 2.5 mg/0.5 mL auto-injector    No Known Allergies    Objective     /85   Pulse 95   Temp 97.7 °F (36.5 °C) (Temporal)   Resp 16   Wt 135 kg (298 lb)   SpO2 96%   BMI 56.31 kg/m²     PHYSICAL EXAM    General Appearance: NAD, cooperative, alert  Eyes: Anicteric  GI:  Soft, non-tender, non-distended; normal bowel sounds; no masses, no organomegaly   Rectal: Deferred until procedure  Musculoskeletal: No edema.  Skin:  No jaundice    ASSESSMENT/PLAN:  This is a 49 y.o. female here for colonoscopy, and  she is stable and optimized for her procedure.

## 2024-12-05 ENCOUNTER — RESULTS FOLLOW-UP (OUTPATIENT)
Dept: GASTROENTEROLOGY | Facility: CLINIC | Age: 49
End: 2024-12-05

## 2025-02-12 ENCOUNTER — OFFICE VISIT (OUTPATIENT)
Dept: FAMILY MEDICINE CLINIC | Facility: HOSPITAL | Age: 50
End: 2025-02-12
Payer: COMMERCIAL

## 2025-02-12 VITALS
SYSTOLIC BLOOD PRESSURE: 130 MMHG | HEIGHT: 61 IN | HEART RATE: 81 BPM | WEIGHT: 293 LBS | DIASTOLIC BLOOD PRESSURE: 82 MMHG | BODY MASS INDEX: 55.32 KG/M2 | OXYGEN SATURATION: 98 %

## 2025-02-12 DIAGNOSIS — R03.0 ELEVATED BLOOD PRESSURE READING IN OFFICE WITHOUT DIAGNOSIS OF HYPERTENSION: ICD-10-CM

## 2025-02-12 DIAGNOSIS — E66.813 CLASS 3 SEVERE OBESITY DUE TO EXCESS CALORIES WITHOUT SERIOUS COMORBIDITY WITH BODY MASS INDEX (BMI) OF 50.0 TO 59.9 IN ADULT (HCC): Primary | ICD-10-CM

## 2025-02-12 DIAGNOSIS — E66.01 CLASS 3 SEVERE OBESITY DUE TO EXCESS CALORIES WITHOUT SERIOUS COMORBIDITY WITH BODY MASS INDEX (BMI) OF 50.0 TO 59.9 IN ADULT (HCC): Primary | ICD-10-CM

## 2025-02-12 PROCEDURE — 99214 OFFICE O/P EST MOD 30 MIN: CPT | Performed by: STUDENT IN AN ORGANIZED HEALTH CARE EDUCATION/TRAINING PROGRAM

## 2025-02-12 RX ORDER — SEMAGLUTIDE 0.25 MG/.5ML
INJECTION, SOLUTION SUBCUTANEOUS
Qty: 2 ML | Refills: 0 | Status: SHIPPED | OUTPATIENT
Start: 2025-02-12

## 2025-02-12 NOTE — PATIENT INSTRUCTIONS
Please call [facility]'s Central Scheduling to make an appt: [phone] for mammo     Call [employer] to ask about appeal, vs tier exception.   WEGOVY vs ZEPBOUND - Cost   Approved if you have sleep apnea.     Weight loss center of [facility]   [facility]   [employer]   Compounding pharmacies - [facility]   [facility]     Gastric Bypass - Dr. Escalante

## 2025-02-12 NOTE — ASSESSMENT & PLAN NOTE
Has been using calorie counting suzanne.      Prior Authorization Clinical Questions for Weight Management Pharmacotherapy    1. Does the patient have a contrainidcation to medication prescribed for weight management?: No  2. Does the patient have a diagnosis of obesity, confirmed by a BMI greater than or equal to 30 kg/m^2?: Yes  3. Does the patient have a BMI of greater than or equal to 27 kg/m^2 with at least one weight-related comorbidity/risk factor/complication (e.g. diabetes, dyslipidemia, coronary artery disease)?: No  5. Has the patient been on a weight loss regimen of low-calorie diet, increased physical activity, and lifestyle modifications for a minimum of 6 months?: Yes  6. Has the patient completed a comprehensive weight loss program (ie, Weight Watchers, Noom, Bariatrics, other suzanne on phone)? If so, what?: Yes    -- Q6 Further Explanation: Weight Watchers & Noom & Calorie Counter Suzanne   7. Does the patient have a history of type 2 diabetes?: No  8. Has the member tried and failed other weight loss medication within the past 12 months?: No  9. Will the member use requested medication in combination with another GLP agonist or weight loss drug?: No  10. Is the medication a controlled substance?: No     Baseline weight (in pounds): 300 lbs    Orders:  •  Semaglutide-Weight Management (Wegovy) 0.25 MG/0.5ML; Inject 0.25 mg under the skin weekly  •  Ambulatory Referral to Weight Management; Future

## 2025-02-12 NOTE — PROGRESS NOTES
Name: Miki Morgan      : 1975      MRN: [id number]  Encounter Provider: Tara Knapp DO  Encounter Date: 2025   Encounter department: [facility] [address]  :  Assessment & Plan  Class 3 severe obesity due to excess calories without serious comorbidity with body mass index (BMI) of 50.0 to 59.9 in adult ([facility])  Has been using calorie counting suzanne.      Prior Authorization Clinical Questions for Weight Management Pharmacotherapy    1. Does the patient have a contrainidcation to medication prescribed for weight management?: No  2. Does the patient have a diagnosis of obesity, confirmed by a BMI greater than or equal to 30 kg/m^2?: Yes  3. Does the patient have a BMI of greater than or equal to 27 kg/m^2 with at least one weight-related comorbidity/risk factor/complication (e.g. diabetes, dyslipidemia, coronary artery disease)?: No  5. Has the patient been on a weight loss regimen of low-calorie diet, increased physical activity, and lifestyle modifications for a minimum of 6 months?: Yes  6. Has the patient completed a comprehensive weight loss program (ie, Weight Watchers, Noom, Bariatrics, other suzanne on phone)? If so, what?: Yes    -- Q6 Further Explanation: Weight Watchers & Noom & Calorie Counter Suzanne   7. Does the patient have a history of type 2 diabetes?: No  8. Has the member tried and failed other weight loss medication within the past 12 months?: No  9. Will the member use requested medication in combination with another GLP agonist or weight loss drug?: No  10. Is the medication a controlled substance?: No     Baseline weight (in pounds): 300 lbs    Orders:  •  Semaglutide-Weight Management (Wegovy) 0.25 MG/0.5ML; Inject 0.25 mg under the skin weekly  •  Ambulatory Referral to Weight Management; Future    Elevated blood pressure reading in office without diagnosis of hypertension    Orders:  •  Semaglutide-Weight Management (Wegovy) 0.25 MG/0.5ML; Inject 0.25 mg  "under the skin weekly          Depression Screening and Follow-up Plan: Patient was screened for depression during today's encounter. They screened negative with a PHQ-2 score of 0.        History of Present Illness   Chief Complaint   Patient presents with   • Follow-up     3 month  Ear pain right side    HPI    Sick in Jan. Ears still lingering.     Trying to get weight loss medications due to very high BMI, young age, not being covered by her insurance.   Had tried phentermine with Dr. Couch prior PCP, got very sick on it.   Had visual changes, anxiety increase.     Doesn't check home BP's.   Wt Readings from Last 3 Encounters:   02/12/25 (!) 136 kg (300 lb 3.2 oz)   12/02/24 135 kg (298 lb)   11/08/24 (!) 136 kg (300 lb 9.6 oz)     Temp Readings from Last 3 Encounters:   12/02/24 97.7 °F (36.5 °C) (Temporal)   10/15/24 97.9 °F (36.6 °C)   09/02/22 98.9 °F (37.2 °C) (Temporal)     BP Readings from Last 3 Encounters:   02/12/25 130/82   12/02/24 159/87   11/08/24 (!) 189/104     Pulse Readings from Last 3 Encounters:   02/12/25 81   12/02/24 80   11/08/24 80     Review of Systems   Constitutional:  Negative for chills and fever.   HENT:  Positive for ear pain.    Respiratory:  Negative for cough (better) and shortness of breath.    Cardiovascular:  Negative for chest pain and palpitations.   Neurological:  Positive for headaches (seem to be from work computer screen, sometimes otc heps, sometimes rides it out, not great on water intake). Negative for dizziness and light-headedness.     Objective   /82   Pulse 81   Ht 5' 1\" (1.549 m)   Wt (!) 136 kg (300 lb 3.2 oz)   SpO2 98%   BMI 56.72 kg/m²      Physical Exam  Vitals and nursing note reviewed.   Constitutional:       General: She is not in acute distress.     Appearance: Normal appearance. She is well-developed. She is morbidly obese. She is not ill-appearing.   HENT:      Head: Normocephalic and atraumatic.      Right Ear: Tympanic membrane, ear " canal and external ear normal. There is no impacted cerumen.      Left Ear: Tympanic membrane, ear canal and external ear normal. There is no impacted cerumen.   Eyes:      General: No scleral icterus.        Right eye: No discharge.         Left eye: No discharge.      Conjunctiva/sclera: Conjunctivae normal.   Cardiovascular:      Rate and Rhythm: Normal rate and regular rhythm.      Pulses: Normal pulses.      Heart sounds: Normal heart sounds. No murmur heard.  Pulmonary:      Effort: Pulmonary effort is normal. No respiratory distress.      Breath sounds: Normal breath sounds.   Musculoskeletal:      Cervical back: Normal range of motion and neck supple. No rigidity.   Skin:     General: Skin is warm and dry.      Capillary Refill: Capillary refill takes less than 2 seconds.   Neurological:      Mental Status: She is alert and oriented to person, place, and time.      Gait: Gait normal.   Psychiatric:         Mood and Affect: Mood normal.         Behavior: Behavior normal. Behavior is cooperative.         Thought Content: Thought content normal.         Judgment: Judgment normal.

## 2025-06-13 ENCOUNTER — OFFICE VISIT (OUTPATIENT)
Dept: FAMILY MEDICINE CLINIC | Facility: HOSPITAL | Age: 50
End: 2025-06-13
Payer: COMMERCIAL

## 2025-06-13 VITALS
SYSTOLIC BLOOD PRESSURE: 110 MMHG | HEART RATE: 92 BPM | OXYGEN SATURATION: 96 % | BODY MASS INDEX: 54.94 KG/M2 | HEIGHT: 61 IN | DIASTOLIC BLOOD PRESSURE: 84 MMHG | WEIGHT: 291 LBS

## 2025-06-13 DIAGNOSIS — E66.813 CLASS 3 SEVERE OBESITY DUE TO EXCESS CALORIES WITHOUT SERIOUS COMORBIDITY WITH BODY MASS INDEX (BMI) OF 50.0 TO 59.9 IN ADULT: ICD-10-CM

## 2025-06-13 DIAGNOSIS — M25.562 BILATERAL CHRONIC KNEE PAIN: ICD-10-CM

## 2025-06-13 DIAGNOSIS — M25.561 BILATERAL CHRONIC KNEE PAIN: ICD-10-CM

## 2025-06-13 DIAGNOSIS — G89.29 BILATERAL CHRONIC KNEE PAIN: ICD-10-CM

## 2025-06-13 DIAGNOSIS — R05.2 SUBACUTE COUGH: Primary | ICD-10-CM

## 2025-06-13 PROCEDURE — 99214 OFFICE O/P EST MOD 30 MIN: CPT | Performed by: STUDENT IN AN ORGANIZED HEALTH CARE EDUCATION/TRAINING PROGRAM

## 2025-06-13 RX ORDER — AZITHROMYCIN 250 MG/1
TABLET, FILM COATED ORAL
Qty: 6 TABLET | Refills: 0 | Status: SHIPPED | OUTPATIENT
Start: 2025-06-13 | End: 2025-06-18

## 2025-06-13 NOTE — PROGRESS NOTES
Name: Miki Morgan      : 1975      MRN: [id number]  Encounter Provider: Tara Knapp DO  Encounter Date: 2025   Encounter department: [facility] C[facility] [address]  :  Assessment & Plan  Subacute cough  Trial zpak for acute cough / concerns as below.   OTC options reviewed with patient. Adequate rest, proper water hydration, Vit C, D, Zinc, tea with honey, mucinex, cough drops, steam shower, nasal rinses, etc.   Orders:  •  azithromycin (Zithromax) 250 mg tablet; Take 2 tablets (500 mg total) by mouth daily for 1 day, THEN 1 tablet (250 mg total) daily for 4 days.    Bilateral chronic knee pain  Start with XR's, can do CSI's if needed  Prior L one outdated at this time.   Orders:  •  XR knee 3 vw right non injury; Future  •  XR knee 3 vw left non injury; Future    Class 3 severe obesity due to excess calories without serious comorbidity with body mass index (BMI) of 50.0 to 59.9 in adult  Will work on exercise & calorie counting.   Consider noom or weight watchers.   Can call insurance about either wegovy or zepbound.        Return in about 2 weeks (around 2025) for Steroid injection : B/L Knees .       History of Present Illness   HPI  Chief Complaint   Patient presents with   • Follow-up     Notes cough for over 1 week.   Cold like symptoms for longer.     Bringing up yellow green phlegm  Worse after laying down.   Using robitussin & mucinex.     Will try to lose weight, more walking, less carbs and less calories.   MIL saw doctor in [place] for knee injections. Seemed to help.     Does get bad knee pain b/l. Usually R > L.   Hx of R meniscal tear & surgery (2019)  Sometimes hard times getting out of bed.   Gets bad knee stiffness also b/l   On concrete at work, using membreno sneakers at work.   Has to wear steel toe at work.     XR L knee -  - Lateral and patellofemoral degenerative arthritis. No acute osseous abnormality.    Review of Systems   Constitutional:   "Negative for chills and fever.   HENT:  Positive for congestion, ear pain (mildly), rhinorrhea, sinus pressure, sinus pain and voice change. Negative for sore throat.    Eyes:  Negative for pain, redness and itching.   Respiratory:  Positive for cough and shortness of breath (with exertion right now worse).    Cardiovascular:  Negative for chest pain and palpitations.   Gastrointestinal:  Negative for diarrhea, nausea and vomiting.   Musculoskeletal:  Positive for arthralgias (b/l knees).   Neurological:  Negative for dizziness, light-headedness and headaches.     Objective   /84   Pulse 92   Ht 5' 1\" (1.549 m)   Wt 132 kg (291 lb)   SpO2 96%   BMI 54.98 kg/m²      Physical Exam  Vitals and nursing note reviewed.   Constitutional:       General: She is not in acute distress.     Appearance: Normal appearance. She is well-developed. She is obese. She is not ill-appearing.   HENT:      Head: Normocephalic and atraumatic.      Right Ear: Tympanic membrane, ear canal and external ear normal. There is no impacted cerumen.      Left Ear: Tympanic membrane, ear canal and external ear normal. There is no impacted cerumen.      Nose: Nose normal. No congestion or rhinorrhea.      Mouth/Throat:      Mouth: Mucous membranes are moist.      Pharynx: Oropharynx is clear. No oropharyngeal exudate or posterior oropharyngeal erythema.     Eyes:      General: No scleral icterus.        Right eye: No discharge.         Left eye: No discharge.      Conjunctiva/sclera: Conjunctivae normal.     Neck:      Comments: No thyroid nodules or thyromegaly.  Cardiovascular:      Rate and Rhythm: Normal rate and regular rhythm.      Pulses: Normal pulses.      Heart sounds: Normal heart sounds. No murmur heard.  Pulmonary:      Effort: Pulmonary effort is normal. No respiratory distress.      Breath sounds: Normal breath sounds. No wheezing or rhonchi.     Musculoskeletal:         General: Tenderness (b/l knees) present. Normal range " of motion.      Cervical back: Normal range of motion and neck supple. No rigidity or tenderness.      Right lower leg: No edema.      Left lower leg: No edema.   Lymphadenopathy:      Cervical: No cervical adenopathy.     Skin:     General: Skin is warm and dry.      Capillary Refill: Capillary refill takes less than 2 seconds.      Findings: No erythema or rash.     Neurological:      Mental Status: She is alert and oriented to person, place, and time.      Gait: Gait normal.     Psychiatric:         Mood and Affect: Mood normal.         Behavior: Behavior normal.         Thought Content: Thought content normal.         Judgment: Judgment normal.

## 2025-06-13 NOTE — ASSESSMENT & PLAN NOTE
Will work on exercise & calorie counting.   Consider noom or weight watchers.   Can call insurance about either wegovy or zepbound.

## 2025-06-30 ENCOUNTER — APPOINTMENT (OUTPATIENT)
Dept: RADIOLOGY | Facility: CLINIC | Age: 50
End: 2025-06-30
Payer: COMMERCIAL

## 2025-06-30 ENCOUNTER — APPOINTMENT (OUTPATIENT)
Dept: RADIOLOGY | Facility: CLINIC | Age: 50
End: 2025-06-30
Attending: STUDENT IN AN ORGANIZED HEALTH CARE EDUCATION/TRAINING PROGRAM
Payer: COMMERCIAL

## 2025-06-30 DIAGNOSIS — M25.562 BILATERAL CHRONIC KNEE PAIN: ICD-10-CM

## 2025-06-30 DIAGNOSIS — G89.29 BILATERAL CHRONIC KNEE PAIN: ICD-10-CM

## 2025-06-30 DIAGNOSIS — M25.561 BILATERAL CHRONIC KNEE PAIN: ICD-10-CM

## 2025-06-30 PROCEDURE — 73562 X-RAY EXAM OF KNEE 3: CPT

## 2025-07-09 ENCOUNTER — OFFICE VISIT (OUTPATIENT)
Dept: FAMILY MEDICINE CLINIC | Facility: HOSPITAL | Age: 50
End: 2025-07-09
Payer: COMMERCIAL

## 2025-07-09 VITALS
HEART RATE: 90 BPM | SYSTOLIC BLOOD PRESSURE: 120 MMHG | OXYGEN SATURATION: 96 % | HEIGHT: 61 IN | BODY MASS INDEX: 55.32 KG/M2 | DIASTOLIC BLOOD PRESSURE: 86 MMHG | WEIGHT: 293 LBS

## 2025-07-09 DIAGNOSIS — M17.0 PRIMARY OSTEOARTHRITIS OF BOTH KNEES: ICD-10-CM

## 2025-07-09 DIAGNOSIS — G89.29 BILATERAL CHRONIC KNEE PAIN: Primary | ICD-10-CM

## 2025-07-09 DIAGNOSIS — M25.561 BILATERAL CHRONIC KNEE PAIN: Primary | ICD-10-CM

## 2025-07-09 DIAGNOSIS — M25.562 BILATERAL CHRONIC KNEE PAIN: Primary | ICD-10-CM

## 2025-07-09 PROCEDURE — 20610 DRAIN/INJ JOINT/BURSA W/O US: CPT | Performed by: STUDENT IN AN ORGANIZED HEALTH CARE EDUCATION/TRAINING PROGRAM

## 2025-07-09 PROCEDURE — 99214 OFFICE O/P EST MOD 30 MIN: CPT | Performed by: STUDENT IN AN ORGANIZED HEALTH CARE EDUCATION/TRAINING PROGRAM

## 2025-07-09 RX ADMIN — TRIAMCINOLONE ACETONIDE 40 MG: 40 INJECTION, SUSPENSION INTRA-ARTICULAR; INTRAMUSCULAR at 15:15

## 2025-07-09 RX ADMIN — LIDOCAINE HYDROCHLORIDE 4 ML: 10 INJECTION, SOLUTION EPIDURAL; INFILTRATION; INTRACAUDAL; PERINEURAL at 15:15

## 2025-07-27 RX ORDER — TRIAMCINOLONE ACETONIDE 40 MG/ML
40 INJECTION, SUSPENSION INTRA-ARTICULAR; INTRAMUSCULAR
Status: COMPLETED | OUTPATIENT
Start: 2025-07-09 | End: 2025-07-09

## 2025-07-27 RX ORDER — LIDOCAINE HYDROCHLORIDE 10 MG/ML
4 INJECTION, SOLUTION EPIDURAL; INFILTRATION; INTRACAUDAL; PERINEURAL
Status: COMPLETED | OUTPATIENT
Start: 2025-07-09 | End: 2025-07-09